# Patient Record
Sex: MALE | Race: BLACK OR AFRICAN AMERICAN | Employment: OTHER | URBAN - METROPOLITAN AREA
[De-identification: names, ages, dates, MRNs, and addresses within clinical notes are randomized per-mention and may not be internally consistent; named-entity substitution may affect disease eponyms.]

---

## 2017-10-22 ENCOUNTER — HOSPITAL ENCOUNTER (EMERGENCY)
Age: 67
Discharge: HOME | End: 2017-10-22
Attending: EMERGENCY MEDICINE | Admitting: EMERGENCY MEDICINE
Payer: MEDICARE

## 2017-10-22 ENCOUNTER — APPOINTMENT (OUTPATIENT)
Dept: GENERAL RADIOLOGY | Age: 67
End: 2017-10-22
Attending: PHYSICIAN ASSISTANT
Payer: MEDICARE

## 2017-10-22 VITALS
WEIGHT: 159.9 LBS | SYSTOLIC BLOOD PRESSURE: 150 MMHG | DIASTOLIC BLOOD PRESSURE: 90 MMHG | HEART RATE: 78 BPM | TEMPERATURE: 98 F | OXYGEN SATURATION: 99 % | RESPIRATION RATE: 21 BRPM | BODY MASS INDEX: 21.66 KG/M2 | HEIGHT: 72 IN

## 2017-10-22 DIAGNOSIS — R42 DIZZINESS: ICD-10-CM

## 2017-10-22 DIAGNOSIS — I20.8 OTHER FORMS OF ANGINA PECTORIS (HCC): Primary | ICD-10-CM

## 2017-10-22 DIAGNOSIS — G20 PARKINSON'S DISEASE (HCC): ICD-10-CM

## 2017-10-22 DIAGNOSIS — M48.062 SPINAL STENOSIS OF LUMBAR REGION WITH NEUROGENIC CLAUDICATION: ICD-10-CM

## 2017-10-22 DIAGNOSIS — R07.89 ATYPICAL CHEST PAIN: Primary | ICD-10-CM

## 2017-10-22 LAB
ALBUMIN SERPL-MCNC: 3.3 G/DL (ref 3.5–5)
ALBUMIN/GLOB SERPL: 0.9 {RATIO} (ref 1.1–2.2)
ALP SERPL-CCNC: 96 U/L (ref 45–117)
ALT SERPL-CCNC: 28 U/L (ref 12–78)
ANION GAP SERPL CALC-SCNC: 10 MMOL/L (ref 5–15)
AST SERPL-CCNC: 19 U/L (ref 15–37)
ATRIAL RATE: 64 BPM
BASOPHILS # BLD: 0 K/UL (ref 0–0.1)
BASOPHILS NFR BLD: 1 % (ref 0–1)
BILIRUB SERPL-MCNC: 0.4 MG/DL (ref 0.2–1)
BUN SERPL-MCNC: 11 MG/DL (ref 6–20)
BUN/CREAT SERPL: 10 (ref 12–20)
CALCIUM SERPL-MCNC: 8.9 MG/DL (ref 8.5–10.1)
CALCULATED P AXIS, ECG09: 50 DEGREES
CALCULATED R AXIS, ECG10: 55 DEGREES
CALCULATED T AXIS, ECG11: 50 DEGREES
CHLORIDE SERPL-SCNC: 109 MMOL/L (ref 97–108)
CK SERPL-CCNC: 60 U/L (ref 39–308)
CO2 SERPL-SCNC: 26 MMOL/L (ref 21–32)
CREAT SERPL-MCNC: 1.07 MG/DL (ref 0.7–1.3)
DIAGNOSIS, 93000: NORMAL
EOSINOPHIL # BLD: 0.2 K/UL (ref 0–0.4)
EOSINOPHIL NFR BLD: 3 % (ref 0–7)
ERYTHROCYTE [DISTWIDTH] IN BLOOD BY AUTOMATED COUNT: 14.3 % (ref 11.5–14.5)
GLOBULIN SER CALC-MCNC: 3.6 G/DL (ref 2–4)
GLUCOSE SERPL-MCNC: 136 MG/DL (ref 65–100)
HCT VFR BLD AUTO: 41.6 % (ref 36.6–50.3)
HGB BLD-MCNC: 13.8 G/DL (ref 12.1–17)
LYMPHOCYTES # BLD: 1.2 K/UL (ref 0.8–3.5)
LYMPHOCYTES NFR BLD: 20 % (ref 12–49)
MCH RBC QN AUTO: 31.7 PG (ref 26–34)
MCHC RBC AUTO-ENTMCNC: 33.2 G/DL (ref 30–36.5)
MCV RBC AUTO: 95.4 FL (ref 80–99)
MONOCYTES # BLD: 0.6 K/UL (ref 0–1)
MONOCYTES NFR BLD: 10 % (ref 5–13)
NEUTS SEG # BLD: 4 K/UL (ref 1.8–8)
NEUTS SEG NFR BLD: 66 % (ref 32–75)
P-R INTERVAL, ECG05: 152 MS
PLATELET # BLD AUTO: 175 K/UL (ref 150–400)
POTASSIUM SERPL-SCNC: 4.1 MMOL/L (ref 3.5–5.1)
PROT SERPL-MCNC: 6.9 G/DL (ref 6.4–8.2)
Q-T INTERVAL, ECG07: 412 MS
QRS DURATION, ECG06: 92 MS
QTC CALCULATION (BEZET), ECG08: 425 MS
RBC # BLD AUTO: 4.36 M/UL (ref 4.1–5.7)
SODIUM SERPL-SCNC: 145 MMOL/L (ref 136–145)
TROPONIN I SERPL-MCNC: <0.04 NG/ML
VENTRICULAR RATE, ECG03: 64 BPM
WBC # BLD AUTO: 6 K/UL (ref 4.1–11.1)

## 2017-10-22 PROCEDURE — 82550 ASSAY OF CK (CPK): CPT | Performed by: PHYSICIAN ASSISTANT

## 2017-10-22 PROCEDURE — 74011250637 HC RX REV CODE- 250/637: Performed by: PHYSICIAN ASSISTANT

## 2017-10-22 PROCEDURE — 80053 COMPREHEN METABOLIC PANEL: CPT | Performed by: PHYSICIAN ASSISTANT

## 2017-10-22 PROCEDURE — 84484 ASSAY OF TROPONIN QUANT: CPT | Performed by: PHYSICIAN ASSISTANT

## 2017-10-22 PROCEDURE — 36415 COLL VENOUS BLD VENIPUNCTURE: CPT | Performed by: PHYSICIAN ASSISTANT

## 2017-10-22 PROCEDURE — 85025 COMPLETE CBC W/AUTO DIFF WBC: CPT | Performed by: PHYSICIAN ASSISTANT

## 2017-10-22 PROCEDURE — 99285 EMERGENCY DEPT VISIT HI MDM: CPT

## 2017-10-22 PROCEDURE — 71010 XR CHEST PORT: CPT

## 2017-10-22 PROCEDURE — 93005 ELECTROCARDIOGRAM TRACING: CPT

## 2017-10-22 RX ORDER — ASPIRIN 325 MG
325 TABLET ORAL
Status: COMPLETED | OUTPATIENT
Start: 2017-10-22 | End: 2017-10-22

## 2017-10-22 RX ORDER — ATORVASTATIN CALCIUM 20 MG/1
20 TABLET, FILM COATED ORAL DAILY
COMMUNITY
Start: 2017-07-19 | End: 2022-04-15

## 2017-10-22 RX ORDER — METOPROLOL TARTRATE 25 MG/1
TABLET, FILM COATED ORAL
COMMUNITY
Start: 2017-07-19 | End: 2019-01-09

## 2017-10-22 RX ADMIN — ASPIRIN 325 MG ORAL TABLET 325 MG: 325 PILL ORAL at 14:34

## 2017-10-22 NOTE — PROGRESS NOTES
ED visit for chest pain, acute cardiac event excluded. Patient unable to exercise to target heart rate because of spinal stenosis and Parkinson's.  Will set up ambulatory LexiScan perfusion test.

## 2017-10-22 NOTE — DISCHARGE INSTRUCTIONS
Chest Pain: Care Instructions  Your Care Instructions  There are many things that can cause chest pain. Some are not serious and will get better on their own in a few days. But some kinds of chest pain need more testing and treatment. Your doctor may have recommended a follow-up visit in the next 8 to 12 hours. If you are not getting better, you may need more tests or treatment. Even though your doctor has released you, you still need to watch for any problems. The doctor carefully checked you, but sometimes problems can develop later. If you have new symptoms or if your symptoms do not get better, get medical care right away. If you have worse or different chest pain or pressure that lasts more than 5 minutes or you passed out (lost consciousness), call 911 or seek other emergency help right away. A medical visit is only one step in your treatment. Even if you feel better, you still need to do what your doctor recommends, such as going to all suggested follow-up appointments and taking medicines exactly as directed. This will help you recover and help prevent future problems. How can you care for yourself at home? · Rest until you feel better. · Take your medicine exactly as prescribed. Call your doctor if you think you are having a problem with your medicine. · Do not drive after taking a prescription pain medicine. When should you call for help? Call 911 if:  · You passed out (lost consciousness). · You have severe difficulty breathing. · You have symptoms of a heart attack. These may include:  ¨ Chest pain or pressure, or a strange feeling in your chest.  ¨ Sweating. ¨ Shortness of breath. ¨ Nausea or vomiting. ¨ Pain, pressure, or a strange feeling in your back, neck, jaw, or upper belly or in one or both shoulders or arms. ¨ Lightheadedness or sudden weakness. ¨ A fast or irregular heartbeat.   After you call 911, the  may tell you to chew 1 adult-strength or 2 to 4 low-dose aspirin. Wait for an ambulance. Do not try to drive yourself. Call your doctor today if:  · You have any trouble breathing. · Your chest pain gets worse. · You are dizzy or lightheaded, or you feel like you may faint. · You are not getting better as expected. · You are having new or different chest pain. Where can you learn more? Go to http://mando-shilpi.info/. Enter A120 in the search box to learn more about \"Chest Pain: Care Instructions. \"  Current as of: March 20, 2017  Content Version: 11.3  © 1102-4708 [employer]. Care instructions adapted under license by [employer] (which disclaims liability or warranty for this information). If you have questions about a medical condition or this instruction, always ask your healthcare professional. [address] any warranty or liability for your use of this information.

## 2017-10-22 NOTE — ED PROVIDER NOTES
Patient is a 69 y.o. male presenting with chest pain. The history is provided by the patient. Chest Pain (Angina)    This is a new problem. The current episode started 2 days ago. The problem has not changed since onset. The problem occurs constantly. The pain is present in the left side. The pain is at a severity of 4/10. The pain is mild. The quality of the pain is described as dull. The pain does not radiate. Exacerbated by: standing. Associated symptoms include dizziness and shortness of breath (started today). Pertinent negatives include no abdominal pain, no back pain, no cough, no diaphoresis, no exertional chest pressure, no fever, no nausea, no vomiting and no weakness. He has tried nothing for the symptoms. Risk factors include male gender. Past medical history comments: COPD, CVA, parkinson's dz. Past Medical History:   Diagnosis Date    Chronic obstructive pulmonary disease ([facility]     emphysema    Parkinson's disease ([facility]     Stroke ([facility] [address] [facility] 2002    no residual problems       Past Surgical History:   Procedure Laterality Date    HX CATARACT REMOVAL      right    HX TONSILLECTOMY           No family history on file. Social History     Social History    Marital status:      Spouse name: N/A    Number of children: N/A    Years of education: N/A     Occupational History    Not on file. Social History Main Topics    Smoking status: Former Smoker    Smokeless tobacco: Never Used    Alcohol use 2.4 oz/week     4 Glasses of wine per week    Drug use: No    Sexual activity: Not on file     Other Topics Concern    Not on file     Social History Narrative         ALLERGIES: Review of patient's allergies indicates no known allergies. Review of Systems   Constitutional: Negative for diaphoresis and fever. Respiratory: Positive for shortness of breath (started today). Negative for cough, chest tightness, wheezing and stridor. Cardiovascular: Positive for chest pain.  Negative for leg swelling. Gastrointestinal: Negative for abdominal pain, nausea and vomiting. Musculoskeletal: Negative for back pain. Neurological: Positive for dizziness. Negative for speech difficulty and weakness. All other systems reviewed and are negative. Vitals:    10/22/17 1423   BP: (!) 147/92   Pulse: 73   Resp: 18   Temp: 98 °F (36.7 °C)   SpO2: 98%            Physical Exam   Constitutional: He is oriented to person, place, and time. He appears well-developed and well-nourished. No distress. HENT:   Head: Normocephalic and atraumatic. Mouth/Throat: Oropharynx is clear and moist. No oropharyngeal exudate. Eyes: Conjunctivae are normal.   Neck: Normal range of motion. Cardiovascular: Normal rate, regular rhythm and normal heart sounds. Pulmonary/Chest: Effort normal and breath sounds normal. No respiratory distress. He has no wheezes. He has no rales. Musculoskeletal: Normal range of motion. Neurological: He is alert and oriented to person, place, and time. Skin: Skin is warm and dry. Psychiatric: He has a normal mood and affect. His behavior is normal. Judgment and thought content normal.   Nursing note and vitals reviewed. MDM  Number of Diagnoses or Management Options  Atypical chest pain:   Diagnosis management comments: DDX:  COPD exacerbation, ACS, angina, CAD, musculoskeletal CP, PNA       Amount and/or Complexity of Data Reviewed  Clinical lab tests: ordered and reviewed  Tests in the radiology section of CPT®: ordered and reviewed  Discuss the patient with other providers: yes (Attending, Dr Maria L Hernandez  3:44 PM   He also went to see pt and we reviewed labs and imaging results. We will consult cardiologist, Dr Stacey Johnson, since he is in ED now to determine tx and plan)      ED Course       Procedures    ED EKG interpretation:  Rhythm: normal sinus rhythm; and regular .  Rate (approx.): 64; Axis: normal; P wave: normal; QRS interval: normal ; ST/T wave: normal. This EKG was interpreted by Ingrid Harper PA-C,ED Provider. MEDICATIONS GIVEN:  Medications   aspirin (ASPIRIN) tablet 325 mg (325 mg Oral Given 10/22/17 1434)       LABS REVIEWED:  Recent Results (from the past 24 hour(s))   EKG, 12 LEAD, INITIAL    Collection Time: 10/22/17  2:24 PM   Result Value Ref Range    Ventricular Rate 64 BPM    Atrial Rate 64 BPM    P-R Interval 152 ms    QRS Duration 92 ms    Q-T Interval 412 ms    QTC Calculation (Bezet) 425 ms    Calculated P Axis 50 degrees    Calculated R Axis 55 degrees    Calculated T Axis 50 degrees    Diagnosis       Normal sinus rhythm  Normal ECG  No previous ECGs available     CBC WITH AUTOMATED DIFF    Collection Time: 10/22/17  2:50 PM   Result Value Ref Range    WBC 6.0 4.1 - 11.1 K/uL    RBC 4.36 4.10 - 5.70 M/uL    HGB 13.8 12.1 - 17.0 g/dL    HCT 41.6 36.6 - 50.3 %    MCV 95.4 80.0 - 99.0 FL    MCH 31.7 26.0 - 34.0 PG    MCHC 33.2 30.0 - 36.5 g/dL    RDW 14.3 11.5 - 14.5 %    PLATELET 923 332 - 124 K/uL    NEUTROPHILS 66 32 - 75 %    LYMPHOCYTES 20 12 - 49 %    MONOCYTES 10 5 - 13 %    EOSINOPHILS 3 0 - 7 %    BASOPHILS 1 0 - 1 %    ABS. NEUTROPHILS 4.0 1.8 - 8.0 K/UL    ABS. LYMPHOCYTES 1.2 0.8 - 3.5 K/UL    ABS. MONOCYTES 0.6 0.0 - 1.0 K/UL    ABS. EOSINOPHILS 0.2 0.0 - 0.4 K/UL    ABS.  BASOPHILS 0.0 0.0 - 0.1 K/UL   TROPONIN I    Collection Time: 10/22/17  2:50 PM   Result Value Ref Range    Troponin-I, Qt. <0.04 <0.05 ng/mL   CK W/ REFLX CKMB    Collection Time: 10/22/17  2:50 PM   Result Value Ref Range    CK 60 39 - 106 U/L   METABOLIC PANEL, COMPREHENSIVE    Collection Time: 10/22/17  2:50 PM   Result Value Ref Range    Sodium 145 136 - 145 mmol/L    Potassium 4.1 3.5 - 5.1 mmol/L    Chloride 109 (H) 97 - 108 mmol/L    CO2 26 21 - 32 mmol/L    Anion gap 10 5 - 15 mmol/L    Glucose 136 (H) 65 - 100 mg/dL    BUN 11 6 - 20 MG/DL    Creatinine 1.07 0.70 - 1.30 MG/DL    BUN/Creatinine ratio 10 (L) 12 - 20      GFR est AA >60 >60 ml/min/1.73m2    GFR est non-AA >60 >60 ml/min/1.73m2    Calcium 8.9 8.5 - 10.1 MG/DL    Bilirubin, total 0.4 0.2 - 1.0 MG/DL    ALT (SGPT) 28 12 - 78 U/L    AST (SGOT) 19 15 - 37 U/L    Alk. phosphatase 96 45 - 117 U/L    Protein, total 6.9 6.4 - 8.2 g/dL    Albumin 3.3 (L) 3.5 - 5.0 g/dL    Globulin 3.6 2.0 - 4.0 g/dL    A-G Ratio 0.9 (L) 1.1 - 2.2         VITAL SIGNS:  Patient Vitals for the past 12 hrs:   Temp Pulse Resp BP SpO2   10/22/17 1600 - 78 21 150/90 99 %   10/22/17 1516 - 76 19 172/84 99 %   10/22/17 1515 - 64 16 (!) 160/141 98 %   10/22/17 1505 - 65 17 - 98 %   10/22/17 1500 - 68 15 - 98 %   10/22/17 1452 - 65 17 - 98 %   10/22/17 1445 - 74 15 - 99 %   10/22/17 1439 - 85 19 - 99 %   10/22/17 1426 - - - (!) 147/92 -   10/22/17 1423 98 °F (36.7 °C) 73 18 (!) 147/92 98 %       RADIOLOGY RESULTS:  The following have been ordered and reviewed:  XR CHEST PORT   Final Result        Study Result      EXAM:  XR CHEST PORT     INDICATION:  chest pain     COMPARISON:  None.     FINDINGS: A portable AP radiograph of the chest was obtained at 1430 hours. The  patient is on a cardiac monitor. The lungs are hyperinflated but clear. The  cardiac and mediastinal contours and pulmonary vascularity are normal.  The  bones and soft tissues are grossly within normal limits.      IMPRESSION  IMPRESSION: No acute findings. CONSULTATIONS:     4:45 PM    I spoke with Dr. Milagro Salinas, Consult for Cardiology. Discussed available diagnostic tests and clinical findings. He went in to evaluate pt. He feels symptoms likely muscular in nature and does not feel pt needs admission at this time but will f/u with him for outpatient stress test.  Zander Collazo PA-C      PROGRESS NOTES:  A/P  4:45 PM  Reviewed labs and/or imaging results with pt. The patient's signs, symptoms, diagnosis, and discharge instruction have been discussed and the patient has conveyed their understanding.  The patient is to follow up with Dr Milagro Salinas or return to the ER should their symptoms worsen. Plan has been discussed and the patient is in agreement. Pt is ready for discharge      DIAGNOSIS:    1. Atypical chest pain        PLAN:  Follow-up Information     Follow up With Details Comments Contact Info    Kishor Adams MD Schedule an appointment as soon as possible for a visit on 10/23/2017 for outpatient stress test [address]      [facility] EM[facility]  If symptoms worsen Katarina Cao        Current Discharge Medication List      CONTINUE these medications which have NOT CHANGED    Details   albuterol (PROAIR HFA) 90 mcg/actuation inhaler Take 2 Puffs by inhalation every six (6) hours as needed for Wheezing. budesonide-formoterol (SYMBICORT) 80-4.5 mcg/actuation HFAA inhaler Take 2 Puffs by inhalation two (2) times a day. aspirin delayed-release 81 mg tablet Take 81 mg by mouth daily.

## 2017-10-22 NOTE — ED NOTES
Emergency Department Nursing Plan of Care       The Nursing Plan of Care is developed from the Nursing assessment and Emergency Department Attending provider initial evaluation. The plan of care may be reviewed in the ED Provider note.     The Plan of Care was developed with the following considerations:   Patient / Family readiness to learn indicated by:verbalized understanding and appropriate questions asked  Persons(s) to be included in education: patient  Barriers to Learning/Limitations:No    Signed     Adelfo Belcher    10/22/2017   2:36 PM
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
## 2018-01-30 ENCOUNTER — HOSPITAL ENCOUNTER (OUTPATIENT)
Dept: NEUROLOGY | Age: 68
Discharge: HOME | End: 2018-01-30
Attending: GENERAL ACUTE CARE HOSPITAL
Payer: MEDICARE

## 2018-01-30 DIAGNOSIS — G40.909 EPILEPSY (HCC): ICD-10-CM

## 2018-01-30 PROCEDURE — 95816 EEG AWAKE AND DROWSY: CPT

## 2018-10-15 ENCOUNTER — HOSPITAL ENCOUNTER (OUTPATIENT)
Dept: CT IMAGING | Age: 68
Discharge: HOME | End: 2018-10-15
Payer: MEDICARE

## 2018-10-15 DIAGNOSIS — R64 CACHEXIA (HCC): ICD-10-CM

## 2018-10-15 LAB — CREAT BLD-MCNC: 0.9 MG/DL (ref 0.6–1.3)

## 2018-10-15 PROCEDURE — 74011000255 HC RX REV CODE- 255: Performed by: RADIOLOGY

## 2018-10-15 PROCEDURE — 82565 ASSAY OF CREATININE: CPT

## 2018-10-15 PROCEDURE — 74011636320 HC RX REV CODE- 636/320: Performed by: RADIOLOGY

## 2018-10-15 PROCEDURE — 74177 CT ABD & PELVIS W/CONTRAST: CPT

## 2018-10-15 RX ORDER — SODIUM CHLORIDE 0.9 % (FLUSH) 0.9 %
10 SYRINGE (ML) INJECTION
Status: COMPLETED | OUTPATIENT
Start: 2018-10-15 | End: 2018-10-15

## 2018-10-15 RX ORDER — BARIUM SULFATE 20 MG/ML
900 SUSPENSION ORAL
Status: COMPLETED | OUTPATIENT
Start: 2018-10-15 | End: 2018-10-15

## 2018-10-15 RX ADMIN — IOPAMIDOL 100 ML: 755 INJECTION, SOLUTION INTRAVENOUS at 14:40

## 2018-10-15 RX ADMIN — Medication 10 ML: at 14:40

## 2018-10-15 RX ADMIN — BARIUM SULFATE 900 ML: 20 SUSPENSION ORAL at 14:40

## 2018-11-01 ENCOUNTER — HOSPITAL ENCOUNTER (OUTPATIENT)
Dept: CT IMAGING | Age: 68
Discharge: HOME | End: 2018-11-01
Attending: FAMILY MEDICINE
Payer: MEDICARE

## 2018-11-01 DIAGNOSIS — R63.4 UNINTENTIONAL WEIGHT LOSS: ICD-10-CM

## 2018-11-01 PROCEDURE — 71260 CT THORAX DX C+: CPT

## 2018-11-01 PROCEDURE — 74011636320 HC RX REV CODE- 636/320

## 2018-11-01 RX ADMIN — IOPAMIDOL 100 ML: 612 INJECTION, SOLUTION INTRAVENOUS at 12:00

## 2019-01-09 RX ORDER — CITALOPRAM 10 MG/1
10 TABLET ORAL DAILY
COMMUNITY

## 2019-01-09 RX ORDER — ALBUTEROL SULFATE 90 UG/1
2 AEROSOL, METERED RESPIRATORY (INHALATION)
COMMUNITY

## 2019-01-09 RX ORDER — ACETAMINOPHEN 500 MG
500 TABLET ORAL DAILY
COMMUNITY
End: 2019-05-31

## 2019-01-09 RX ORDER — CARBIDOPA AND LEVODOPA 25; 250 MG/1; MG/1
1 TABLET ORAL DAILY
COMMUNITY
End: 2019-05-31 | Stop reason: SDUPTHER

## 2019-01-09 RX ORDER — RIBOFLAVIN (VITAMIN B2) 100 MG
100 TABLET ORAL DAILY
COMMUNITY
End: 2022-04-15

## 2019-01-09 RX ORDER — TRAZODONE HYDROCHLORIDE 50 MG/1
50 TABLET ORAL
Status: ON HOLD | COMMUNITY
End: 2020-02-19 | Stop reason: CLARIF

## 2019-01-09 RX ORDER — ONDANSETRON HYDROCHLORIDE 8 MG/1
8 TABLET, FILM COATED ORAL
COMMUNITY

## 2019-01-09 RX ORDER — DIAZEPAM 5 MG/1
5 TABLET ORAL DAILY
COMMUNITY
End: 2019-05-31

## 2019-01-09 RX ORDER — METOPROLOL TARTRATE 25 MG/1
25 TABLET, FILM COATED ORAL DAILY
Status: ON HOLD | COMMUNITY
End: 2020-02-19 | Stop reason: CLARIF

## 2019-01-09 RX ORDER — PANTOPRAZOLE SODIUM 40 MG/1
40 TABLET, DELAYED RELEASE ORAL DAILY
Status: ON HOLD | COMMUNITY
End: 2020-02-19 | Stop reason: CLARIF

## 2019-01-10 ENCOUNTER — ANESTHESIA EVENT (OUTPATIENT)
Dept: ENDOSCOPY | Age: 69
End: 2019-01-10
Payer: MEDICARE

## 2019-01-10 ENCOUNTER — ANESTHESIA (OUTPATIENT)
Dept: ENDOSCOPY | Age: 69
End: 2019-01-10
Payer: MEDICARE

## 2019-01-10 ENCOUNTER — HOSPITAL ENCOUNTER (OUTPATIENT)
Age: 69
Setting detail: OUTPATIENT SURGERY
Discharge: HOME | End: 2019-01-10
Attending: INTERNAL MEDICINE | Admitting: INTERNAL MEDICINE
Payer: MEDICARE

## 2019-01-10 VITALS
WEIGHT: 134.7 LBS | SYSTOLIC BLOOD PRESSURE: 144 MMHG | RESPIRATION RATE: 18 BRPM | DIASTOLIC BLOOD PRESSURE: 88 MMHG | HEART RATE: 58 BPM | OXYGEN SATURATION: 98 % | BODY MASS INDEX: 18.27 KG/M2

## 2019-01-10 PROCEDURE — 76060000031 HC ANESTHESIA FIRST 0.5 HR: Performed by: INTERNAL MEDICINE

## 2019-01-10 PROCEDURE — 74011250636 HC RX REV CODE- 250/636

## 2019-01-10 PROCEDURE — 88305 TISSUE EXAM BY PATHOLOGIST: CPT

## 2019-01-10 PROCEDURE — 77030009426 HC FCPS BIOP ENDOSC BSC -B: Performed by: INTERNAL MEDICINE

## 2019-01-10 PROCEDURE — 76040000019: Performed by: INTERNAL MEDICINE

## 2019-01-10 RX ORDER — SODIUM CHLORIDE 9 MG/ML
50 INJECTION, SOLUTION INTRAVENOUS CONTINUOUS
Status: DISCONTINUED | OUTPATIENT
Start: 2019-01-10 | End: 2019-01-10 | Stop reason: HOSPADM

## 2019-01-10 RX ORDER — FENTANYL CITRATE 50 UG/ML
100 INJECTION, SOLUTION INTRAMUSCULAR; INTRAVENOUS
Status: DISCONTINUED | OUTPATIENT
Start: 2019-01-10 | End: 2019-01-10 | Stop reason: HOSPADM

## 2019-01-10 RX ORDER — SODIUM CHLORIDE 0.9 % (FLUSH) 0.9 %
5-40 SYRINGE (ML) INJECTION EVERY 8 HOURS
Status: DISCONTINUED | OUTPATIENT
Start: 2019-01-10 | End: 2019-01-10 | Stop reason: HOSPADM

## 2019-01-10 RX ORDER — MIDAZOLAM HYDROCHLORIDE 1 MG/ML
.25-5 INJECTION, SOLUTION INTRAMUSCULAR; INTRAVENOUS
Status: DISCONTINUED | OUTPATIENT
Start: 2019-01-10 | End: 2019-01-10 | Stop reason: HOSPADM

## 2019-01-10 RX ORDER — SODIUM CHLORIDE 0.9 % (FLUSH) 0.9 %
5-40 SYRINGE (ML) INJECTION AS NEEDED
Status: DISCONTINUED | OUTPATIENT
Start: 2019-01-10 | End: 2019-01-10 | Stop reason: HOSPADM

## 2019-01-10 RX ORDER — DEXTROMETHORPHAN/PSEUDOEPHED 2.5-7.5/.8
1.2 DROPS ORAL
Status: DISCONTINUED | OUTPATIENT
Start: 2019-01-10 | End: 2019-01-10 | Stop reason: HOSPADM

## 2019-01-10 RX ORDER — SODIUM CHLORIDE 9 MG/ML
INJECTION, SOLUTION INTRAVENOUS
Status: DISCONTINUED | OUTPATIENT
Start: 2019-01-10 | End: 2019-01-10 | Stop reason: HOSPADM

## 2019-01-10 RX ORDER — PROPOFOL 10 MG/ML
INJECTION, EMULSION INTRAVENOUS AS NEEDED
Status: DISCONTINUED | OUTPATIENT
Start: 2019-01-10 | End: 2019-01-10 | Stop reason: HOSPADM

## 2019-01-10 RX ORDER — FLUMAZENIL 0.1 MG/ML
0.2 INJECTION INTRAVENOUS
Status: DISCONTINUED | OUTPATIENT
Start: 2019-01-10 | End: 2019-01-10 | Stop reason: HOSPADM

## 2019-01-10 RX ORDER — LIDOCAINE HYDROCHLORIDE 20 MG/ML
INJECTION, SOLUTION EPIDURAL; INFILTRATION; INTRACAUDAL; PERINEURAL AS NEEDED
Status: DISCONTINUED | OUTPATIENT
Start: 2019-01-10 | End: 2019-01-10 | Stop reason: HOSPADM

## 2019-01-10 RX ORDER — ATROPINE SULFATE 0.1 MG/ML
0.5 INJECTION INTRAVENOUS
Status: DISCONTINUED | OUTPATIENT
Start: 2019-01-10 | End: 2019-01-10 | Stop reason: HOSPADM

## 2019-01-10 RX ORDER — NALOXONE HYDROCHLORIDE 0.4 MG/ML
0.4 INJECTION, SOLUTION INTRAMUSCULAR; INTRAVENOUS; SUBCUTANEOUS
Status: DISCONTINUED | OUTPATIENT
Start: 2019-01-10 | End: 2019-01-10 | Stop reason: HOSPADM

## 2019-01-10 RX ORDER — EPINEPHRINE 0.1 MG/ML
1 INJECTION INTRACARDIAC; INTRAVENOUS
Status: DISCONTINUED | OUTPATIENT
Start: 2019-01-10 | End: 2019-01-10 | Stop reason: HOSPADM

## 2019-01-10 RX ADMIN — SODIUM CHLORIDE: 9 INJECTION, SOLUTION INTRAVENOUS at 14:00

## 2019-01-10 RX ADMIN — LIDOCAINE HYDROCHLORIDE 100 MG: 20 INJECTION, SOLUTION EPIDURAL; INFILTRATION; INTRACAUDAL; PERINEURAL at 14:23

## 2019-01-10 RX ADMIN — PROPOFOL 100 MG: 10 INJECTION, EMULSION INTRAVENOUS at 14:23

## 2019-01-10 NOTE — DISCHARGE INSTRUCTIONS
[address]  [address] [facility]           Karyle Pita  [id number]  1950    EGD DISCHARGE INSTRUCTIONS    Discomfort:  Sore throat- throat lozenges or warm salt water gargle  redness at IV site- apply warm compress to area; if redness or soreness persist- contact your physician  Gaseous discomfort- walking, belching will help relieve any discomfort  You may not operate a vehicle for 12 hours  You may not engage in an occupation involving machinery or appliances for rest of today  You may not drink alcoholic beverages for at least 12 hours  Avoid making any critical decisions for at least 24 hour  DIET  You may have anything by mouth- do not eat or drink for two hours. You may eat and drink after you leave. You may resume your regular diet - however -  remember your colon is empty and a heavy meal will produce gas. Avoid these foods:  vegetables, fried / greasy foods, carbonated drinks        ACTIVITY  You may resume your normal daily activities   Spend the remainder of the day resting -  avoid any strenuous activity. CALL M.D. ANY SIGN OF   Increasing pain, nausea, vomiting  Abdominal distension (swelling)  New increased bleeding (oral or rectal)  Fever (chills)  Pain in chest area  Bloody discharge from nose or mouth  Shortness of breath    Follow-up Instructions:   Call Timmy Palacios for any questions or problems. Telephone # [phone]  Biopsy results available  in  5 to 7 days. We will call you or send a letter   Continue same medications.     Impression:  gastrits, R/O infiltrative process    Vitor Winslow MD  1/10/2019  2:41 PM

## 2019-01-10 NOTE — H&P
[address]                 Shawnee Gallegos is a  77 y.o.  male who presents with epigastric pain , anorexia and 30 pound weight loss. .        Past Medical History:   Diagnosis Date    Arthritis     Chronic obstructive pulmonary disease (HCC)     emphysema    Ill-defined condition     hx shingles    Ill-defined condition     hx broken left shoulder - no surgery    Ill-defined condition     hx dislocated left shoulder    Ill-defined condition     \"blood not flowing as well through aorta per pt\"    Ill-defined condition     spinal stenosis - PT/injection/accupunture/exercise [facility] 2 tmes a week    [facility]'s [facility] [address] [address]     Stroke ([facility] [address] 2002    no residual problems     Past Surgical History:   Procedure Laterality Date    HX CATARACT REMOVAL Bilateral     HX ORTHOPAEDIC      steroid injections in back d/t spinal stenosis    HX TONSILLECTOMY       Allergies   Allergen Reactions    Lisinopril Cough    Other Medication Rash     Vaccine for polio caused a rash.      Current Facility-Administered Medications   Medication Dose Route Frequency Provider Last Rate Last Dose    0.9% sodium chloride infusion  50 mL/hr IntraVENous CONTINUOUS Meron Castro MD        sodium chloride (NS) flush 5-40 mL  5-40 mL IntraVENous Q8H Meron Castro MD        sodium chloride (NS) flush 5-40 mL  5-40 mL IntraVENous PRN Meron Castro MD        midazolam (VERSED) injection 0.25-5 mg  0.25-5 mg IntraVENous Dain Castro MD        fentaNYL citrate (PF) injection 100 mcg  100 mcg IntraVENous Dain Castro MD        naloxone [facility]) injection 0.4 mg  0.4 mg IntraVENous Dain Castro MD        flumazenil (ROMAZICON) 0.1 mg/mL injection 0.2 mg  0.2 mg IntraVENous Dain Castro MD        [facility]) 81SI/2.1WU oral drops 80 mg  1.2 mL Oral Dain Castro MD        atropine injection 0.5 mg  0.5 mg IntraVENous ONCE PRN Kleber Guillory MD        EPINEPHrine (ADRENALIN) 0.1 mg/mL syringe 1 mg  1 mg Endoscopically ONCE PRN Kleber Guillory MD         Facility-Administered Medications Ordered in Other Encounters   Medication Dose Route Frequency Provider Last Rate Last Dose    0.9% sodium chloride infusion   IntraVENous CONTINUOUS Indy Ramsay, DINORAH           Visit Vitals  /72   Pulse 67   Resp 18   Wt 61.1 kg (134 lb 11.2 oz)   SpO2 100%   BMI 18.27 kg/m²           PHYSICAL EXAM:  General: WD, WN. Alert, cooperative, no acute distress    HEENT: NC, Atraumatic. PERRLA, EOMI. Anicteric sclerae. Mallampati score 2  Lungs:  CTA Bilaterally. No Wheezing/Rhonchi/Rales. Heart:  Regular  rhythm,  No murmur (), No Rubs, No Gallops  Abdomen: Soft, Non distended, Non tender.  +Bowel sounds, no HSM  Extremities: No c/c/e  Neurologic:  CN 2-12 gi, Alert and oriented X 3. No acute neurological distress   Psych:   Good insight. Not anxious nor agitated. Plan:   Endoscopic procedure with MAC.     Priscilla José MD  1/10/2019  2:23 PM

## 2019-01-10 NOTE — PROCEDURES
[address]  [address]               :  Shahla Washington MD    Referring Provider: Thomas Doss MD    Sedation:  MAC anesthesia Propofol        Prior to the procedure its objectives, risks, consequences and alternatives were discussed with the patient who then elected to proceed. The patient had the opportunity to ask questions and those questions were answered. A physical exam was performed. The heart, lungs, and mental status were examined prior to the procedure and found to be satisfactory for conscious sedation and for the procedure. Conscious sedation was initiated by the physician. Continuous pulse oximetry and blood pressure monitoring were used throughout the procedure. After appropriate pharyngeal anesthesia, the endoscope was passed into the esophagus without difficulty. The proximal and distal esophagus are normal. The scope was passed into the stomach without difficulty. The fundus is normal. In the body and antrum, there is moderate gastritis with suggestion of infiltrative process. A biopsy was obtained for Surgical path without problems from the body and antrum. The pylorus, bulb and postbulbar area are unremarkable. On slow withdrawal of the scope, no abnormalities were noted. Retroflexion revealed normal cardia and fundus. He tolerated the procedure without complications and will be discharged later today. Specimen Removed:  Biopsy of antrum    Complications: None. EBL:  None.     Shahla Washington MD  1/10/2019  2:39 PM

## 2019-01-10 NOTE — ROUTINE PROCESS
Héctor Lama  1950  [id number]    Situation:  Verbal report received from: Isela Prieto RN  Procedure: Procedure(s):  ESOPHAGOGASTRODUODENOSCOPY (EGD)  ESOPHAGOGASTRODUODENAL (EGD) BIOPSY    Background:    Preoperative diagnosis: ABNORMAL WEIGHTLOSS  NASEASA AND VOMITING  ABDOMINAL PAIN  EPIGASTRIC PAIN  Postoperative diagnosis: gastrits, R/O infiltrative process    :  Dr. Meka Rosales  Assistant(s): Endoscopy Technician-1: Ailin Miller  Endoscopy RN-1: Lord Mary RN    Specimens:   ID Type Source Tests Collected by Time Destination   1 : gasttic bx, please R/O infiltrative process, gastritis Preservative   Pedro Krishnamurthy MD 1/10/2019 1427 Pathology     H. Pylori  no    Assessment:  Intra-procedure medications       Anesthesia gave intra-procedure sedation and medications, see anesthesia flow sheet yes    Intravenous fluids: NS@ KVO     Vital signs stable     Abdominal assessment: round and soft     Recommendation:  Discharge patient per MD order  Family or Friend  Pt brother  Permission to share finding with family or friend yes

## 2019-01-10 NOTE — ANESTHESIA POSTPROCEDURE EVALUATION
Post-Anesthesia Evaluation and Assessment Patient: Tuan Stewart MRN: [id number]  SSN: [id number] YOB: 1950  Age: 77 y.o. Sex: male I have evaluated the patient and they are stable and ready for discharge from the PACU. Cardiovascular Function/Vital Signs Visit Vitals BP (P) 123/84 Pulse (P) 60 Resp (P) 14 Wt 61.1 kg (134 lb 11.2 oz) SpO2 (P) 96% BMI 18.27 kg/m² Patient is status post MAC anesthesia for Procedure(s): ESOPHAGOGASTRODUODENOSCOPY (EGD) ESOPHAGOGASTRODUODENAL (EGD) BIOPSY. Nausea/Vomiting: None Postoperative hydration reviewed and adequate. Pain: 
Pain Scale 1: (P) Numeric (0 - 10) (01/10/19 1447) Pain Intensity 1: (P) 0 (01/10/19 1447) Managed Neurological Status: At baseline Mental Status, Level of Consciousness: Alert and  oriented to person, place, and time Pulmonary Status:  
O2 Device: (P) Room air (01/10/19 1447) Adequate oxygenation and airway patent Complications related to anesthesia: None Post-anesthesia assessment completed. No concerns Signed By: Luna Judge MD   
 January 10, 2019 Procedure(s): ESOPHAGOGASTRODUODENOSCOPY (EGD) ESOPHAGOGASTRODUODENAL (EGD) BIOPSY. <BSHSIANPOST> Visit Vitals BP (P) 123/84 Pulse (P) 60 Resp (P) 14 Wt 61.1 kg (134 lb 11.2 oz) SpO2 (P) 96% BMI 18.27 kg/m²

## 2019-01-10 NOTE — ANESTHESIA PREPROCEDURE EVALUATION
Anesthetic History No history of anesthetic complications Review of Systems / Medical History Patient summary reviewed, nursing notes reviewed and pertinent labs reviewed Pulmonary COPD: moderate Neuro/Psych CVA Comments: Parkinsons Cardiovascular Hypertension Exercise tolerance: >4 METS 
  
GI/Hepatic/Renal 
  
GERD Endo/Other Arthritis Other Findings Physical Exam 
 
Airway Mallampati: II 
TM Distance: > 6 cm Neck ROM: normal range of motion Mouth opening: Normal 
 
 Cardiovascular Regular rate and rhythm,  S1 and S2 normal,  no murmur, click, rub, or gallop Dental 
 
Dentition: Full upper dentures and Full lower dentures Pulmonary Breath sounds clear to auscultation Abdominal 
GI exam deferred Other Findings Anesthetic Plan ASA: 3 Anesthesia type: MAC Anesthetic plan and risks discussed with: Patient

## 2019-01-10 NOTE — PERIOP NOTES
Patient has been evaluated by anesthesia pre-procedure. Patient alert and oriented. Vital signs will not be charted by the Endoscopy nurse. All vitals, airway, and loc are monitored by anesthesia staff throughout procedure.        .Endoscope was pre-cleaned at bedside immediately following procedure by Alex Schmitt

## 2019-05-31 ENCOUNTER — OFFICE VISIT (OUTPATIENT)
Dept: NEUROLOGY | Age: 69
End: 2019-05-31

## 2019-05-31 VITALS
HEART RATE: 80 BPM | WEIGHT: 140.2 LBS | DIASTOLIC BLOOD PRESSURE: 56 MMHG | SYSTOLIC BLOOD PRESSURE: 88 MMHG | OXYGEN SATURATION: 93 % | HEIGHT: 72 IN | TEMPERATURE: 98.1 F | RESPIRATION RATE: 16 BRPM | BODY MASS INDEX: 18.99 KG/M2

## 2019-05-31 DIAGNOSIS — G20 PARKINSON'S DISEASE (HCC): Primary | ICD-10-CM

## 2019-05-31 RX ORDER — CARBIDOPA AND LEVODOPA 25; 250 MG/1; MG/1
1 TABLET ORAL 3 TIMES DAILY
Qty: 90 TAB | Refills: 6 | Status: SHIPPED | OUTPATIENT
Start: 2019-05-31 | End: 2019-11-21 | Stop reason: SDUPTHER

## 2019-05-31 NOTE — PROGRESS NOTES
Judith Arita is a 68 y.o. male      Exam RM: 15       Chief Complaint   Patient presents with    New Patient     Pt is here to establish care.               Health Maintenance Due   Topic Date Due    Hepatitis C Screening  1950    DTaP/Tdap/Td series (1 - Tdap) 04/04/1969    Shingrix Vaccine Age 50> (1 of 2) 04/04/1998    FOBT Q 1 YEAR AGE 50-75  04/04/1998    GLAUCOMA SCREENING Q2Y  04/04/2013    Pneumococcal 65+ years (1 of 2 - PCV13) 04/04/2013    MEDICARE YEARLY EXAM  03/14/2018         Visit Vitals  BP (!) 88/56 (BP 1 Location: Left arm, BP Patient Position: Sitting)   Pulse 80   Temp 98.1 °F (36.7 °C) (Oral)   Resp 16   Ht 6' (1.829 m)   Wt 140 lb 3.2 oz (63.6 kg)   SpO2 93%   BMI 19.01 kg/m²

## 2019-05-31 NOTE — LETTER
6/3/19 Patient: Jodi Gonzalez YOB: 1950 Date of Visit: 5/31/2019 Sid Albright MD 
[address] [facility] [address] VIA Facsimile: [phone] Dear Sid Albright MD, Thank you for referring Mr. Juan Montemayor to [address] [place] [address] for evaluation. My notes for this consultation are attached. If you have questions, please do not hesitate to call me. I look forward to following your patient along with you. Sincerely, Kathy Moreno MD

## 2019-05-31 NOTE — PROGRESS NOTES
Neurology Consult Note      HISTORY PROVIDED BY: patient    Chief Complaint:   Chief Complaint   Patient presents with    New Patient     Pt is here to establish care. Subjective:    Lisa Romero is a 68 y.o. right handed male who presents in consultation for Parkinson's disease. Pt reports that he was diagnosed with PD in 2008. Notices tremors with eating or pouring things. He drags his right foot, he believes due to PD, but also mentions spinal stenosis. No freezing. No swallowing difficulty. He has lost 30lbs in last year for unclear reasons, still being investigated, may have vomiting shortly after eating. No dizziness with standing, stands slowly, no LOC. No constipation. No hallucinations. He is taking Carbidopa-levodopa 25-100mg only 1 tab a day. He has hypophonia and asked how long he had this, he states he hasn't noticed it. 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 His maternal uncle was the  Govenor and first elected black govenor, Harriet Ho. Pt reports that he is worried about his memory, may forget a name or a word. His mother has dementia, but is 81yo. Notes from PCP at urgent care received and reviewed, office visit 4/16/2019-notes gradual worsening of tremors on diazepam and levodopa carbidopa. Has not followed with neurology in a long time. CT abdomen chest negative, patient had weight loss of 20 pounds since July. EGD scheduled. Mention of CVA without residual deficits. History of neoplasm of the thymus.     Past Medical History:   Diagnosis Date    Aortic atherosclerosis ([address]     Arthritis     Chronic obstructive pulmonary disease (HCC)     emphysema    Depression     History of left shoulder fracture     Hyperlipidemia     Hypertension     Ill-defined condition     hx dislocated left shoulder    Lumbar spinal stenosis     PT/injection/accupunture/exercise [employer] 2 tmes a week    Parkinson's disease ([address]     Shingles     Stroke [facility]) 2002    no residual problems      Past Surgical History:   Procedure Laterality Date    HX CATARACT REMOVAL Bilateral     HX ORTHOPAEDIC      steroid injections in back d/t spinal stenosis    HX TONSILLECTOMY        Social History     Socioeconomic History    Marital status: UNKNOWN     Spouse name: Not on file    Number of children: Not on file    Years of education: Not on file    Highest education level: Not on file   Occupational History    Not on file   Social Needs    Financial resource strain: Not on file    Food insecurity:     Worry: Not on file     Inability: Not on file    Transportation needs:     Medical: Not on file     Non-medical: Not on file   Tobacco Use    Smoking status: Former Smoker    Smokeless tobacco: Never Used    Tobacco comment: quit 31 yrs ago   Substance and Sexual Activity    Alcohol use:  Yes     Alcohol/week: 4.2 oz     Types: 7 Glasses of wine per week     Comment: wine with dinner/mixed drink    Drug use: No    Sexual activity: Not on file   Lifestyle    Physical activity:     Days per week: Not on file     Minutes per session: Not on file    Stress: Not on file   Relationships    Social connections:     Talks on phone: Not on file     Gets together: Not on file     Attends Gnosticism service: Not on file     Active member of club or organization: Not on file     Attends meetings of clubs or organizations: Not on file     Relationship status: Not on file    Intimate partner violence:     Fear of current or ex partner: Not on file     Emotionally abused: Not on file     Physically abused: Not on file     Forced sexual activity: Not on file   Other Topics Concern    Not on file   Social History Narrative    Lives in [place] alone, does not drive     Family History   Problem Relation Age of Onset    Other Father         kidney removed - unsure of reason, was estranged    Cancer Maternal Aunt         ? where    Heart Attack Paternal Uncle     Cancer Maternal Grandmother         ? where    Kidney Disease Maternal Aunt     Cancer Cousin         ? where - 35s    Heart Attack Paternal Uncle     Kidney Disease Cousin         kidney failure    Alcohol abuse Cousin     Dementia Mother         she is currently 81yo    No Known Problems Daughter          Objective:   Review of Systems   Constitutional: Negative. Wt loss   HENT: Positive for hearing loss and tinnitus. Eyes: Negative. Respiratory: Negative. Cardiovascular: Positive for chest pain. Gastrointestinal: Positive for nausea and vomiting. Genitourinary: Negative. Incontinence   Musculoskeletal: Negative. Skin: Negative. Neurological: Positive for headaches. Endo/Heme/Allergies: Negative. Psychiatric/Behavioral: Positive for depression and memory loss. Allergies   Allergen Reactions    Lisinopril Cough    Other Medication Rash     Vaccine for polio caused a rash. Meds:  Outpatient Medications Prior to Visit   Medication Sig Dispense Refill    ASPIRIN PO Take 81 mg by mouth daily.  carbidopa-levodopa (SINEMET)  mg per tablet Take 1 Tab by mouth daily.  citalopram (CELEXA) 10 mg tablet Take 10 mg by mouth daily.  metoprolol tartrate (LOPRESSOR) 25 mg tablet Take 25 mg by mouth daily.  ondansetron hcl (ZOFRAN) 8 mg tablet Take 8 mg by mouth three (3) times daily as needed for Nausea.  pantoprazole (PROTONIX) 40 mg tablet Take 40 mg by mouth daily.  tamsulosin HCl (TAMSULOSIN PO) Take 0.4 mg by mouth nightly. tamsulosin ER      traZODone (DESYREL) 50 mg tablet Take 50 mg by mouth nightly.  albuterol (VENTOLIN HFA) 90 mcg/actuation inhaler Take 2 Puffs by inhalation two (2) times daily as needed for Wheezing.  riboflavin, vitamin B2, (VITAMIN B-2) 100 mg tablet Take 100 mg by mouth daily.  OTHER Supplement drink. Drinks one po once daily.       atorvastatin (LIPITOR) 20 mg tablet Take 20 mg by mouth daily.  budesonide-formoterol (SYMBICORT) 80-4.5 mcg/actuation HFAA inhaler Take 2 Puffs by inhalation two (2) times a day.  acetaminophen (ACETAMINOPHEN EXTRA STRENGTH) 500 mg tablet Take 500 mg by mouth daily.  diazePAM (VALIUM) 5 mg tablet Take 5 mg by mouth daily. For tremors. No facility-administered medications prior to visit. Imaging:  MRI Results (most recent):  Results from Hospital Encounter encounter on 06/17/15   MRI LUMB SPINE WO CONT    Narrative **Final Report**       ICD Codes / Adm. Diagnosis: 724.4  722.52 / Thoracic or lumbosacral neurit    Degeneration of lumbar or shine  Examination:  MR L SPINE WO CON  - [id number] - Jun 17 2015  1:49PM  Accession No:  [id number]  Reason:        REPORT:  Clinical indication: Back pain. Technical factors: Sagittal T1-weighted T2-weighted and STIR, axial T1 and   T2-weighted L1-S1. There is some mild bone marrow edema is seen in the chair endplate of L4   possibly degenerative in nature. There is no evidence for marrow   replacement. The alignment is satisfactory. The conus appears unremarkable. No paraspinal masses or fluid collections. L1-L2 shows no focal findings. L2-L3 shows minimal facet disease and ligamentum hypertrophy but no canal or   foraminal compromise. L3-L4 shows some minimal facet disease and ligamentum thickening no focal   findings. L4-5 show a left lateral disc protrusion with some encroachment on the   foramen and possible involvement of the exiting nerve roots. There is no   canal compromise. L5-S1 show no focal findings. IMPRESSION: Left lateral disc protrusion L4-5.               Signing/Reading Doctor: Hanh Jamil ([id number])    Approved: Hanh Jamil ([id number])  Jun 17 2015  2:37PM                                    CT Results (most recent):  Results from Hospital Encounter encounter on 11/01/18   CT CHEST W CONT    Narrative INDICATION: Unintentional weight loss    COMPARISON: CT abdomen 10/15/2018, chest radiograph 10/22/2017 and chest CT  9/14/2015    CONTRAST: 100 cc Isovue-300. TECHNIQUE:  Following the uneventful intravenous administration of 100 cc of  Isovue-300, 5 mm axial images were obtained through the chest. Coronal and  sagittal reconstructions were generated. CT dose reduction was achieved through  use of a standardized protocol tailored for this examination and automatic  exposure control for dose modulation. Norris Gutiérrez FINDINGS:    THYROID: No nodule. MEDIASTINUM: No mass or lymphadenopathy. Normal size lymph nodes in the anterior  mediastinum is unchanged. FELY: No mass or lymphadenopathy. THORACIC AORTA: No aneurysm. MAIN PULMONARY ARTERY: Normal in caliber. TRACHEA/BRONCHI: Patent. ESOPHAGUS: No wall thickening or dilatation. HEART: Normal in size. PLEURA: No effusion or pneumothorax. LUNGS: The lungs remain hyperinflated. There is a paucity of normal size and  branching vessels. The lung fields and this suggest emphysema. The degree of  hyperinflation and lucency throughout the lung fields appear somewhat greater in  the lower lobes than in the upper lobes. This difference in the lung zones is  somewhat more apparent on today's examination than previous examination. One  possible explanation for this with the alpha-1 antitrypsin deficiency. Clinical  correlation is suggested. No suspicious nodules are noted. There is a calcified granuloma in the right  lower lobe which is stable. Small air-filled cyst in the right middle lobe is  again noted. Mild scarring in the lingula and right middle lobe is noted with  slight increase in scarring in the lingula. No suspicious nodules are noted. Central airways are patent. INCIDENTALLY IMAGED UPPER ABDOMEN: No focal abnormality. There are diverticula  identified in the colon. BONES: No destructive bone lesion. The bone island in the T5 vertebral body is  unchanged      Impression IMPRESSION:    1.  The lungs are hyperinflated. There is a paucity of normal size and branching  vessels in the lung fields these findings suggest emphysema. There does appear  to be a slight greater degree of involvement of the lower lobes than when  compared the upper lobes. This raises the possibility of alpha-1 antitrypsin  deficiency. Clinical correlation is suggested. Please see above text          Reviewed records in [employer] and media tab today    Lab Review   Results for orders placed or performed during the hospital encounter of 10/15/18   POC CREATININE   Result Value Ref Range    Creatinine (POC) 0.9 0.6 - 1.3 mg/dL    GFRAA, POC >60 >60 ml/min/1.73m2    GFRNA, POC >60 >60 ml/min/1.73m2        Exam:  Visit Vitals  BP (!) 88/56 (BP 1 Location: Left arm, BP Patient Position: Sitting)   Pulse 80   Temp 98.1 °F (36.7 °C) (Oral)   Resp 16   Ht 6' (1.829 m)   Wt 63.6 kg (140 lb 3.2 oz)   SpO2 93%   BMI 19.01 kg/m²     General:  Alert, cooperative, no distress. Head:  Normocephalic, without obvious abnormality, atraumatic. Respiratory:  Heart:   Non labored breathing  Regular rate and rhythm, no murmurs   Neck:   2+ carotids, no bruits   Extremities: Warm, no cyanosis or edema. Pulses: 2+ radial pulses. Neurologic:  MS: Alert and oriented x 4, speech - hypophonia. Language intact. Attention and fund of knowledge appropriate. Recent and remote memory intact.   Cranial Nerves:  II: visual fields Full to confrontation   II: pupils Equal, round, reactive to light   II: optic disc    III,VII: ptosis none   III,IV,VI: extraocular muscles  EOMI, no nystagmus or diplopia   V: facial light touch sensation  normal   VII: facial muscle function   symmetric   VIII: hearing intact   IX: soft palate elevation  normal   XI: trapezius strength  5/5   XI: sternocleidomastoid strength 5/5   XII: tongue  Midline     Motor: normal bulk and tone, no tremor, mild bradykinesia,              Strength: 5/5 throughout, no PD  Sensory: intact to LT, PP  Coordination: FTN and HTS intact, ОЛЬГА intact  Gait: Slight shuffling, does not lift right leg as well as left, dec arm swing in right arm, stooped posture, extra step to turn. +Pull test  Reflexes: 2+ symmetric, toes downgoing           Assessment/Plan   Pt is a 68 y.o. right handed male diagnosed with PD in 2008, reporting an action tremor, drags his right foot, has hypophonia, no freezing. No swallowing difficulty, no NOH, no constipation. no hallucinations. Only taking Carbidopa-levodopa 25-250mg only 1 tab a day. Exam reveals hypophonia slight shuffling does not lift right leg fully, decreased arm swing on the right, stooped posture, extra step to turn, positive pull test.  No resting tremor seen. Patient does have findings suggestive of Parkinson's, primary versus secondary. Discussed diagnosis and treatment, and goals of treatment. Will increase carbidopa levodopa 25-250mg, 1 tab tid, side effects reviewed. Follow-up in the clinic with a nurse practitioner in 2 months and with me in 6 months, instructed to call me interim with any questions or concerns. ICD-10-CM ICD-9-CM    1. Parkinson's disease ([facility] G20 332.0        Signed:   Rony Brock MD  5/31/2019

## 2019-08-01 ENCOUNTER — OFFICE VISIT (OUTPATIENT)
Dept: NEUROLOGY | Age: 69
End: 2019-08-01

## 2019-08-01 VITALS
OXYGEN SATURATION: 95 % | SYSTOLIC BLOOD PRESSURE: 72 MMHG | BODY MASS INDEX: 18.31 KG/M2 | WEIGHT: 135.2 LBS | HEART RATE: 94 BPM | HEIGHT: 72 IN | RESPIRATION RATE: 14 BRPM | TEMPERATURE: 97.3 F | DIASTOLIC BLOOD PRESSURE: 42 MMHG

## 2019-08-01 DIAGNOSIS — G20 PARKINSON'S DISEASE (HCC): Primary | ICD-10-CM

## 2019-08-01 NOTE — PATIENT INSTRUCTIONS
Discuss with PCP about decreasing Metoprolol. Orthostatic Hypotension maybe related to Sinemet or parkinson's.  
 
 
TRACK BLOOD PRESSURE

## 2019-08-01 NOTE — PROGRESS NOTES
Date:  19     Name:  Tessa Andre  :  1950  MRN:  [id number]     PCP:  Fawad Brock MD    Chief Complaint   Patient presents with    Neurologic Problem       HISTORY OF PRESENT ILLNESS:Follow up visit for parkinson's. He is maintained on Sinemet 25-250mg and is tolerating this without significant side effects. He has no concerns. He notes he is doing well. On exam, his blood pressure were low ranging SBP 80's. Orthostatic blood pressure were also done and  his blood pressure did drop. Denies dizziness,lightheadness,       Current Outpatient Medications   Medication Sig    carbidopa-levodopa (SINEMET)  mg per tablet Take 1 Tab by mouth three (3) times daily.  citalopram (CELEXA) 10 mg tablet Take 10 mg by mouth daily.  metoprolol tartrate (LOPRESSOR) 25 mg tablet Take 25 mg by mouth daily.  ondansetron hcl (ZOFRAN) 8 mg tablet Take 8 mg by mouth three (3) times daily as needed for Nausea.  pantoprazole (PROTONIX) 40 mg tablet Take 40 mg by mouth daily.  tamsulosin HCl (TAMSULOSIN PO) Take 0.4 mg by mouth nightly. tamsulosin ER    traZODone (DESYREL) 50 mg tablet Take 50 mg by mouth nightly.  albuterol (VENTOLIN HFA) 90 mcg/actuation inhaler Take 2 Puffs by inhalation two (2) times daily as needed for Wheezing.  riboflavin, vitamin B2, (VITAMIN B-2) 100 mg tablet Take 100 mg by mouth daily.  OTHER Supplement drink. Drinks one po once daily.  atorvastatin (LIPITOR) 20 mg tablet Take 20 mg by mouth daily.  budesonide-formoterol (SYMBICORT) 80-4.5 mcg/actuation HFAA inhaler Take 2 Puffs by inhalation two (2) times a day.  ASPIRIN PO Take 81 mg by mouth daily. Indications: stopped yesterday for upcoming urology procedure 19. No current facility-administered medications for this visit. Allergies   Allergen Reactions    Lisinopril Cough    Other Medication Rash     Vaccine for polio caused a rash.      Past Medical History:   Diagnosis Date    Aortic atherosclerosis (HCC)     Arthritis     Chronic obstructive pulmonary disease (HCC)     emphysema    Depression     History of left shoulder fracture     Hyperlipidemia     Hypertension     Ill-defined condition     hx dislocated left shoulder    Lumbar spinal stenosis     PT/injection/accupunture/exercise [employer] 2 tmes a week    [facility] ([facility]     Shingles     Stroke ([facility] 2002    no residual problems     Past Surgical History:   Procedure Laterality Date    HX CATARACT REMOVAL Bilateral     HX ORTHOPAEDIC      steroid injections in back d/t spinal stenosis    HX TONSILLECTOMY       Social History     Socioeconomic History    Marital status: UNKNOWN     Spouse name: Not on file    Number of children: Not on file    Years of education: Not on file    Highest education level: Not on file   Occupational History    Not on file   Social Needs    Financial resource strain: Not on file    Food insecurity:     Worry: Not on file     Inability: Not on file    Transportation needs:     Medical: Not on file     Non-medical: Not on file   Tobacco Use    Smoking status: Former Smoker    Smokeless tobacco: Never Used    Tobacco comment: quit 31 yrs ago   Substance and Sexual Activity    Alcohol use:  Yes     Alcohol/week: 7.0 standard drinks     Types: 7 Glasses of wine per week     Comment: wine with dinner/mixed drink    Drug use: No    Sexual activity: Not on file   Lifestyle    Physical activity:     Days per week: Not on file     Minutes per session: Not on file    Stress: Not on file   Relationships    Social connections:     Talks on phone: Not on file     Gets together: Not on file     Attends L[employer]-day service: Not on file     Active member of club or organization: Not on file     Attends meetings of clubs or organizations: Not on file     Relationship status: Not on file    Intimate partner violence:     Fear of current or ex partner: Not on file     Emotionally abused: Not on file     Physically abused: Not on file     Forced sexual activity: Not on file   Other Topics Concern    Not on file   Social History Narrative    Lives in [place] alone, does not drive     Family History   Problem Relation Age of Onset    Other Father         kidney removed - unsure of reason, was estranged    Cancer Maternal Aunt         ? where    Heart Attack Paternal Uncle     Cancer Maternal Grandmother         ? where    Kidney Disease Maternal Aunt     Cancer Cousin         ? where - 35s    Heart Attack Paternal Uncle     Kidney Disease Cousin         kidney failure    Alcohol abuse Cousin     Dementia Mother         she is currently 81yo    No Known Problems Daughter        PHYSICAL EXAMINATION:    Visit Vitals  BP (!) 72/42 (BP 1 Location: Left arm, BP Patient Position: Standing)   Pulse 94   Temp 97.3 °F (36.3 °C) (Oral)   Resp 14   Ht 6' (1.829 m)   Wt 61.3 kg (135 lb 3.2 oz)   SpO2 95%   BMI 18.34 kg/m²     General:  Well defined, nourished, and groomed individual in no acute distress. Neck: Supple, nontender, no bruits, no pain with resistance to active range of motion. Heart: Regular rate and rhythm, no murmurs, rub, or gallop. Normal S1S2. Musculoskeletal:  Extremities revealed no edema and had full range of motion of joints. Psych:  Good mood and bright affect    NEUROLOGICAL EXAMINATION:     Mental Status:   Alert and oriented to person, place, and time with recent and remote memory intact. Attention span and concentration are normal. Speech is slow and soft    Cranial Nerves:    II, III, IV, VI:  Visual acuity grossly intact. Visual fields are normal.    Pupils are equal, round, and reactive to light and accommodation. Extra-ocular movements are full and fluid. Fundoscopic exam was benign, no ptosis or nystagmus. V-XII: Hearing is grossly intact. Facial features are symmetric, with normal sensation and strength.   The palate rises symmetrically and the tongue protrudes midline. Sternocleidomastoids 5/5. Motor Examination: Normal tone, bulk, and strength, 5/5 muscle strength throughout. No cogwheel rigidity or clonus present. Coordination:  Heel-to-shin was smooth and symmetrical bilaterally. Finger to nose and rapid arm movement testing was normal.   No resting or intention tremor    Gait and Station:Slight shuffling, does not lift right leg as well as left, dec arm swing in right arm, stooped posture, extra step to turn. +Pull test    Reflexes:  DTRs 2+ throughout. Toes downgoing. ASSESSMENT AND PLAN    ICD-10-CM ICD-9-CM    1. [facility]'s [facility] ([facility] G20 332.0    Pt is a 68 y.o. right-handed male diagnosed with PD. Currently on Sinemet  mg 1 tab 3 times a day. Questionable NOH Neurogenic orthostatic hypotension. On exam, he had orthostatic blood pressure. He denies symptoms. He has not had much to drink today. Dopaminergic and other medications can cause lower SBP. We discussed tracking blood pressure. Discuss with PCP about decreasing metoprolol. Will continue to monitor if symptoms start or blood pressure does not increase after medication adjustment, we may add medication to treat [address]. 1.Sinmet  mg 1 tab  3 times a day  2. Track blood pressures  This note will not be viewable in [employer].   Jerri Cordova NP

## 2019-08-01 NOTE — PROGRESS NOTES
Josefa Wade is a 68 y.o. male  Chief Complaint   Patient presents with    Neurologic Problem     Visit Vitals  BP (!) 72/42 (BP 1 Location: Left arm, BP Patient Position: Standing)   Pulse 94   Temp 97.3 °F (36.3 °C) (Oral)   Resp 14   Ht 6' (1.829 m)   Wt 61.3 kg (135 lb 3.2 oz)   SpO2 95%   BMI 18.34 kg/m²

## 2019-08-21 ENCOUNTER — HOSPITAL ENCOUNTER (OUTPATIENT)
Dept: CT IMAGING | Age: 69
Discharge: HOME | End: 2019-08-21
Attending: UROLOGY
Payer: MEDICARE

## 2019-08-21 DIAGNOSIS — C61 PROSTATE CANCER (HCC): ICD-10-CM

## 2019-08-21 LAB — CREAT BLD-MCNC: 1.1 MG/DL (ref 0.6–1.3)

## 2019-08-21 PROCEDURE — 74011636320 HC RX REV CODE- 636/320: Performed by: UROLOGY

## 2019-08-21 PROCEDURE — 74177 CT ABD & PELVIS W/CONTRAST: CPT

## 2019-08-21 PROCEDURE — 82565 ASSAY OF CREATININE: CPT

## 2019-08-21 RX ORDER — SODIUM CHLORIDE 0.9 % (FLUSH) 0.9 %
10 SYRINGE (ML) INJECTION
Status: COMPLETED | OUTPATIENT
Start: 2019-08-21 | End: 2019-08-21

## 2019-08-21 RX ADMIN — IOPAMIDOL 100 ML: 755 INJECTION, SOLUTION INTRAVENOUS at 13:27

## 2019-08-21 RX ADMIN — Medication 10 ML: at 13:27

## 2019-09-04 ENCOUNTER — HOSPITAL ENCOUNTER (OUTPATIENT)
Dept: NUCLEAR MEDICINE | Age: 69
Discharge: HOME | End: 2019-09-04
Attending: UROLOGY
Payer: MEDICARE

## 2019-09-04 DIAGNOSIS — C61 PROSTATE CANCER (HCC): ICD-10-CM

## 2019-09-04 PROCEDURE — 78306 BONE IMAGING WHOLE BODY: CPT

## 2019-11-21 ENCOUNTER — OFFICE VISIT (OUTPATIENT)
Dept: NEUROLOGY | Age: 69
End: 2019-11-21

## 2019-11-21 VITALS
RESPIRATION RATE: 14 BRPM | HEIGHT: 72 IN | HEART RATE: 88 BPM | DIASTOLIC BLOOD PRESSURE: 62 MMHG | WEIGHT: 133 LBS | OXYGEN SATURATION: 95 % | BODY MASS INDEX: 18.01 KG/M2 | SYSTOLIC BLOOD PRESSURE: 104 MMHG

## 2019-11-21 DIAGNOSIS — G20 PARKINSON'S DISEASE (HCC): Primary | ICD-10-CM

## 2019-11-21 RX ORDER — CARBIDOPA AND LEVODOPA 25; 250 MG/1; MG/1
1 TABLET ORAL 3 TIMES DAILY
Qty: 90 TAB | Refills: 6 | Status: SHIPPED | OUTPATIENT
Start: 2019-11-21 | End: 2020-06-29

## 2019-11-21 NOTE — PROGRESS NOTES
Sarah Peters is a 68 y.o. male presenting with Parkinsons Disease        No flowsheet data found. Follow up visit for parkinson's. He is maintained on Sinemet 25-250mg and is tolerating this without significant side effects. He has no concerns. He notes he is doing well  Symptoms:   Tremors/Shaking   Muscle stiffness (rigidity): none  Problems with balance, shuffling walk, falls: none  Slowness (bradykinesia): none  Freezing or wearing off, off time:none  Soft voice, issues swallowing:yes  Sudden, erratic movements (Dyskinesias): none   Memory loss, difficulty thinking or remembering clearly: yes   Depression/anxiety: No  Difficulty sleeping, issues with talking in sleep or acting out dreams: no  Constipation: no  Hallucinations/delusions: NO  Nausea: None   Lightheadedness, positional dizziness;None   Compulsive behaviors/urges None   Morning akinesia : NO                 Review of Systems   Constitutional: Negative. HENT: Negative. Eyes: Negative for blurred vision and double vision. Respiratory: Negative. Cardiovascular: Negative. Negative for chest pain and palpitations. Musculoskeletal: Negative. Neurological: Positive for tremors. Psychiatric/Behavioral: Positive for memory loss. Negative for depression, substance abuse and suicidal ideas. The patient is not nervous/anxious.         Past Medical History:   Diagnosis Date    Aortic atherosclerosis ([address]     Arthritis     Chronic obstructive pulmonary disease (HCC)     emphysema    Depression     History of left shoulder fracture     Hyperlipidemia     Hypertension     Ill-defined condition     hx dislocated left shoulder    Lumbar spinal stenosis     PT/injection/accupunture/exercise [employer] 2 tmes a week    Parkinson's disease ([address]     Shingles     Stroke ([address] 2002    no residual problems       Past Surgical History:   Procedure Laterality Date    HX CATARACT REMOVAL Bilateral     HX ORTHOPAEDIC      steroid injections in back d/t spinal stenosis    HX TONSILLECTOMY         Allergies   Allergen Reactions    Lisinopril Cough    Other Medication Rash     Vaccine for polio caused a rash. Current Outpatient Medications   Medication Sig Dispense Refill    carbidopa-levodopa (SINEMET)  mg per tablet Take 1 Tab by mouth three (3) times daily. 90 Tab 6    citalopram (CELEXA) 10 mg tablet Take 10 mg by mouth daily.  ondansetron hcl (ZOFRAN) 8 mg tablet Take 8 mg by mouth three (3) times daily as needed for Nausea.  pantoprazole (PROTONIX) 40 mg tablet Take 40 mg by mouth daily.  tamsulosin HCl (TAMSULOSIN PO) Take 0.4 mg by mouth nightly. tamsulosin ER      traZODone (DESYREL) 50 mg tablet Take 50 mg by mouth nightly.  albuterol (VENTOLIN HFA) 90 mcg/actuation inhaler Take 2 Puffs by inhalation two (2) times daily as needed for Wheezing.  riboflavin, vitamin B2, (VITAMIN B-2) 100 mg tablet Take 100 mg by mouth daily.  OTHER Supplement drink. Drinks one po once daily.  atorvastatin (LIPITOR) 20 mg tablet Take 20 mg by mouth daily.  budesonide-formoterol (SYMBICORT) 80-4.5 mcg/actuation HFAA inhaler Take 2 Puffs by inhalation two (2) times a day.  ASPIRIN PO Take 81 mg by mouth daily. Indications: stopped yesterday for upcoming urology procedure 8-8-19.  metoprolol tartrate (LOPRESSOR) 25 mg tablet Take 25 mg by mouth daily.          Social History     Socioeconomic History    Marital status: UNKNOWN     Spouse name: Not on file    Number of children: Not on file    Years of education: Not on file    Highest education level: Not on file   Occupational History    Not on file   Social Needs    Financial resource strain: Not on file    Food insecurity:     Worry: Not on file     Inability: Not on file    Transportation needs:     Medical: Not on file     Non-medical: Not on file   Tobacco Use    Smoking status: Former Smoker    Smokeless tobacco: Never Used    Tobacco comment: quit 31 yrs ago   Substance and Sexual Activity    Alcohol use: Yes     Alcohol/week: 7.0 standard drinks     Types: 7 Glasses of wine per week     Comment: wine with dinner/mixed drink    Drug use: No    Sexual activity: Not Currently   Lifestyle    Physical activity:     Days per week: Not on file     Minutes per session: Not on file    Stress: Not on file   Relationships    Social connections:     Talks on phone: Not on file     Gets together: Not on file     Attends Congregational service: Not on file     Active member of club or organization: Not on file     Attends meetings of clubs or organizations: Not on file     Relationship status: Not on file    Intimate partner violence:     Fear of current or ex partner: Not on file     Emotionally abused: Not on file     Physically abused: Not on file     Forced sexual activity: Not on file   Other Topics Concern    Not on file   Social History Narrative    Lives in [facility] alone, does not drive       Family History   Problem Relation Age of Onset    Other Father         kidney removed - unsure of reason, was estranged    Cancer Maternal Aunt         ? where    Heart Attack Paternal Uncle     Cancer Maternal Grandmother         ? where    Kidney Disease Maternal Aunt     Cancer Cousin         ? where - 35s    Heart Attack Paternal Uncle     Kidney Disease Cousin         kidney failure    Alcohol abuse Cousin     Dementia Mother         she is currently 79yo    No Known Problems Daughter        Vitals:    11/21/19 1409   BP: 104/62   Pulse: 88   Resp: 14   SpO2: 95%   Weight: 60.3 kg (133 lb)   Height: 6' (1.829 m)        Neurologic Exam     Mental Status   Oriented to person, place, and time. Cranial Nerves     CN II   Visual fields full to confrontation. CN III, IV, VI   Pupils are equal, round, and reactive to light. Extraocular motions are normal.   Right pupil: Shape: regular.    Nystagmus: none   Diplopia: none    Motor Exam Muscle bulk: normal  Overall muscle tone: normal    Strength   Strength 5/5 throughout. Right neck flexion: 4/5  Left neck flexion: 4/5  Right neck extension: 4/5  Left neck extension: 4/5  Right deltoid: 4/5  Left deltoid: 4/5  Right biceps: 4/5  Left biceps: 4/5  Right triceps: 4/5  Left triceps: 4/5  Right wrist flexion: 4/5  Left wrist flexion: 4/5  Right wrist extension: 4/5  Left wrist extension: 4/5  Right interossei: 4/5  Left interossei: 4/5  Right abdominals: 4/5  Left abdominals: 4/5  Right iliopsoas: 4/5  Left iliopsoas: 4/5  Right quadriceps: 4/5  Left quadriceps: 4/5  Right hamstrin/5  Left hamstrin/5  Right glutei: 4/5  Left glutei: 4/5  Right anterior tibial: 4/5  Left anterior tibial: 4/5  Right posterior tibial: 4/5  Left posterior tibial: 4/5  Right peroneal: 4/5  Left peroneal: 4/5  Right gastroc: 4/5  Left gastroc: 4/5    Gait, Coordination, and Reflexes     Coordination   Finger to nose coordination: normal    Tremor   Resting tremor: present  Intention tremor: present  Action tremor: right arm and left arm    Reflexes   Right brachioradialis: 2+  Left brachioradialis: 2+  Right biceps: 2+  Left biceps: 2+  Right triceps: 2+  Left triceps: 2+  Right patellar: 2+  Left patellar: 2+  Right achilles: 2+  Left achilles: 2+  Right : 2+  Left : 2+      Physical Exam  Eyes:      Extraocular Movements: EOM normal.      Pupils: Pupils are equal, round, and reactive to light. Skin:     General: Skin is warm and dry. Neurological:      Mental Status: He is alert and oriented to person, place, and time. Coordination: Finger-Nose-Finger Test normal.      Deep Tendon Reflexes: Strength normal.      Reflex Scores:       Tricep reflexes are 2+ on the right side and 2+ on the left side. Bicep reflexes are 2+ on the right side and 2+ on the left side. Brachioradialis reflexes are 2+ on the right side and 2+ on the left side.        Patellar reflexes are 2+ on the right side and 2+ on the left side. Achilles reflexes are 2+ on the right side and 2+ on the left side. Psychiatric:         Mood and Affect: Mood normal.         Behavior: Behavior normal.         Thought Content: Thought content normal.         Judgment: Judgment normal.         Assessment and Plan:   1. Parkinson's disease ([address]  Doing well    continue Sinemet as Prescribed. Follow up 6 months  This note will not be viewable in MyChart.   Marilyn Ackerman NP

## 2019-11-21 NOTE — PROGRESS NOTES
Pt here to follow up Angelika's. He is still dragging his foot when he walks. He thinks he Is doing pretty good.

## 2020-02-19 ENCOUNTER — ANESTHESIA EVENT (OUTPATIENT)
Dept: ENDOSCOPY | Age: 70
End: 2020-02-19
Payer: MEDICARE

## 2020-02-19 ENCOUNTER — HOSPITAL ENCOUNTER (OUTPATIENT)
Age: 70
Setting detail: OUTPATIENT SURGERY
Discharge: HOME | End: 2020-02-19
Attending: INTERNAL MEDICINE | Admitting: INTERNAL MEDICINE
Payer: MEDICARE

## 2020-02-19 ENCOUNTER — ANESTHESIA (OUTPATIENT)
Dept: ENDOSCOPY | Age: 70
End: 2020-02-19
Payer: MEDICARE

## 2020-02-19 VITALS
OXYGEN SATURATION: 99 % | BODY MASS INDEX: 18.58 KG/M2 | RESPIRATION RATE: 21 BRPM | WEIGHT: 137 LBS | TEMPERATURE: 97.7 F | DIASTOLIC BLOOD PRESSURE: 95 MMHG | HEART RATE: 73 BPM | SYSTOLIC BLOOD PRESSURE: 136 MMHG

## 2020-02-19 PROCEDURE — 74011250636 HC RX REV CODE- 250/636: Performed by: INTERNAL MEDICINE

## 2020-02-19 PROCEDURE — 74011250636 HC RX REV CODE- 250/636: Performed by: NURSE ANESTHETIST, CERTIFIED REGISTERED

## 2020-02-19 PROCEDURE — 74011000250 HC RX REV CODE- 250: Performed by: NURSE ANESTHETIST, CERTIFIED REGISTERED

## 2020-02-19 PROCEDURE — 76060000032 HC ANESTHESIA 0.5 TO 1 HR: Performed by: INTERNAL MEDICINE

## 2020-02-19 PROCEDURE — 76040000007: Performed by: INTERNAL MEDICINE

## 2020-02-19 RX ORDER — PROPOFOL 10 MG/ML
INJECTION, EMULSION INTRAVENOUS AS NEEDED
Status: DISCONTINUED | OUTPATIENT
Start: 2020-02-19 | End: 2020-02-19 | Stop reason: HOSPADM

## 2020-02-19 RX ORDER — FLUMAZENIL 0.1 MG/ML
0.2 INJECTION INTRAVENOUS
Status: DISCONTINUED | OUTPATIENT
Start: 2020-02-19 | End: 2020-02-19 | Stop reason: HOSPADM

## 2020-02-19 RX ORDER — NALOXONE HYDROCHLORIDE 0.4 MG/ML
0.4 INJECTION, SOLUTION INTRAMUSCULAR; INTRAVENOUS; SUBCUTANEOUS
Status: DISCONTINUED | OUTPATIENT
Start: 2020-02-19 | End: 2020-02-19 | Stop reason: HOSPADM

## 2020-02-19 RX ORDER — SODIUM CHLORIDE 9 MG/ML
50 INJECTION, SOLUTION INTRAVENOUS CONTINUOUS
Status: DISCONTINUED | OUTPATIENT
Start: 2020-02-19 | End: 2020-02-19 | Stop reason: HOSPADM

## 2020-02-19 RX ORDER — SODIUM CHLORIDE 0.9 % (FLUSH) 0.9 %
5-40 SYRINGE (ML) INJECTION EVERY 8 HOURS
Status: DISCONTINUED | OUTPATIENT
Start: 2020-02-19 | End: 2020-02-19 | Stop reason: HOSPADM

## 2020-02-19 RX ORDER — ATROPINE SULFATE 0.1 MG/ML
0.5 INJECTION INTRAVENOUS
Status: DISCONTINUED | OUTPATIENT
Start: 2020-02-19 | End: 2020-02-19 | Stop reason: HOSPADM

## 2020-02-19 RX ORDER — EPINEPHRINE 0.1 MG/ML
1 INJECTION INTRACARDIAC; INTRAVENOUS
Status: DISCONTINUED | OUTPATIENT
Start: 2020-02-19 | End: 2020-02-19 | Stop reason: HOSPADM

## 2020-02-19 RX ORDER — SODIUM CHLORIDE 0.9 % (FLUSH) 0.9 %
5-40 SYRINGE (ML) INJECTION AS NEEDED
Status: DISCONTINUED | OUTPATIENT
Start: 2020-02-19 | End: 2020-02-19 | Stop reason: HOSPADM

## 2020-02-19 RX ORDER — DEXTROMETHORPHAN/PSEUDOEPHED 2.5-7.5/.8
1.2 DROPS ORAL
Status: DISCONTINUED | OUTPATIENT
Start: 2020-02-19 | End: 2020-02-19 | Stop reason: HOSPADM

## 2020-02-19 RX ORDER — SODIUM CHLORIDE 9 MG/ML
INJECTION, SOLUTION INTRAVENOUS
Status: DISCONTINUED | OUTPATIENT
Start: 2020-02-19 | End: 2020-02-19 | Stop reason: HOSPADM

## 2020-02-19 RX ORDER — LIDOCAINE HYDROCHLORIDE 20 MG/ML
INJECTION, SOLUTION EPIDURAL; INFILTRATION; INTRACAUDAL; PERINEURAL AS NEEDED
Status: DISCONTINUED | OUTPATIENT
Start: 2020-02-19 | End: 2020-02-19 | Stop reason: HOSPADM

## 2020-02-19 RX ADMIN — SODIUM CHLORIDE: 900 INJECTION, SOLUTION INTRAVENOUS at 11:29

## 2020-02-19 RX ADMIN — LIDOCAINE HYDROCHLORIDE 40 MG: 20 INJECTION, SOLUTION EPIDURAL; INFILTRATION; INTRACAUDAL; PERINEURAL at 11:48

## 2020-02-19 RX ADMIN — PROPOFOL 50 MG: 10 INJECTION, EMULSION INTRAVENOUS at 12:02

## 2020-02-19 RX ADMIN — PROPOFOL 100 MG: 10 INJECTION, EMULSION INTRAVENOUS at 11:48

## 2020-02-19 RX ADMIN — PROPOFOL 50 MG: 10 INJECTION, EMULSION INTRAVENOUS at 12:05

## 2020-02-19 RX ADMIN — PROPOFOL 50 MG: 10 INJECTION, EMULSION INTRAVENOUS at 11:57

## 2020-02-19 NOTE — DISCHARGE INSTRUCTIONS
[address]  [address]          Artie Payor  [id number]  1950    COLON DISCHARGE INSTRUCTIONS    DISCOMFORT:  Redness at IV site- apply warm compress to area; if redness or soreness persist- contact your physician  There may be a slight amount of blood passed from the rectum  Gaseous discomfort- walking, belching will help relieve any discomfort  You may not operate a vehicle for 12 hours  You may not engage in an occupation involving machinery or appliances for rest of today  You may not drink alcoholic beverages for at least 12 hours  Avoid making any critical decisions for at least 24 hour  DIET:   Regular diet. - however -  remember your colon is empty and a heavy meal will produce gas. Avoid these foods:  vegetables, fried / greasy foods, carbonated drinks for today         ACTIVITY:  You may resume your normal daily activities it is recommended that you spend the remainder of the day resting -  avoid any strenuous activity. CALL M.D. ANY SIGN OF:   Increasing pain, nausea, vomiting  Abdominal distension (swelling)  New increased bleeding (oral or rectal)  Fever (chills)  Pain in chest area  Bloody discharge from nose or mouth  Shortness of breath     Follow-up Instructions:   Call Dr. Didier Zhang for any questions or problems. Telephone # [phone]  Should have a repeat colonoscopy in 10 years    Impression:  mild diverticulosis  Patient Education        Diverticulosis: Care Instructions  Your Care Instructions  In diverticulosis, pouches called diverticula form in the wall of the large intestine (colon). The pouches do not cause any pain or other symptoms. Most people who have diverticulosis do not know they have it. But the pouches sometimes bleed, and if they become infected, they can cause pain and other symptoms. When this happens, it is called diverticulitis.   Diverticula form when pressure pushes the wall of the colon outward at certain weak points. A diet that is too low in fiber can cause diverticula. Follow-up care is a key part of your treatment and safety. Be sure to make and go to all appointments, and call your doctor if you are having problems. It's also a good idea to know your test results and keep a list of the medicines you take. How can you care for yourself at home? · Include fruits, leafy green vegetables, beans, and whole grains in your diet each day. These foods are high in fiber. · Take a fiber supplement, such as Citrucel or Metamucil, every day if needed. Read and follow all instructions on the label. · Drink plenty of fluids, enough so that your urine is light yellow or clear like water. If you have kidney, heart, or liver disease and have to limit fluids, talk with your doctor before you increase the amount of fluids you drink. · Get at least 30 minutes of exercise on most days of the week. Walking is a good choice. You also may want to do other activities, such as running, swimming, cycling, or playing tennis or team sports. · Cut out foods that cause gas, pain, or other symptoms. When should you call for help? Call your doctor now or seek immediate medical care if:    · You have belly pain.     · You pass maroon or very bloody stools.     · You have a fever.     · You have nausea and vomiting.     · You have unusual changes in your bowel movements or abdominal swelling.     · You have burning pain when you urinate.     · You have abnormal vaginal discharge.     · You have shoulder pain.     · You have cramping pain that does not get better when you have a bowel movement or pass gas.     · You pass gas or stool from your urethra while urinating.    Watch closely for changes in your health, and be sure to contact your doctor if you have any problems. Where can you learn more? Go to http://mando-shilpi.info/.   Enter F826 in the search box to learn more about \"Diverticulosis: Care Instructions. \"  Current as of: November 7, 2018  Content Version: 12.2  © 8997-2759 [employer]. Care instructions adapted under license by [employer] (which disclaims liability or warranty for this information). If you have questions about a medical condition or this instruction, always ask your healthcare professional. [address] any warranty or liability for your use of this information.

## 2020-02-19 NOTE — ROUTINE PROCESS
Dickson Sinclair  1950  [id number]    Situation:  Verbal report received from: Inaja  Procedure: Procedure(s):  COLONOSCOPY    Background:    Preoperative diagnosis: SCREENING  Postoperative diagnosis: mild diverticulosis    :  Dr. Deepika Cortes  Assistant(s): Endoscopy Technician-1: Dara Fuller  Endoscopy RN-1: Trixie Vides RN    Specimens:   H. Pylori      Assessment:  Intra-procedure medications   Anesthesia gave intra-procedure sedation and medications, see anesthesia flow sheet yes    Intravenous fluids: NS@ KVO     Vital signs stable yes    Abdominal assessment: round and soft yes    Recommendation:  Discharge patient per MD order yes.   Return to floor  Family or Friend yes  Permission to share finding with family or friend yes

## 2020-02-19 NOTE — ANESTHESIA POSTPROCEDURE EVALUATION
Procedure(s):  COLONOSCOPY. MAC    Anesthesia Post Evaluation        Patient location during evaluation: PACU  Patient participation: complete - patient participated  Level of consciousness: awake  Pain management: adequate  Airway patency: patent  Anesthetic complications: no  Cardiovascular status: hemodynamically stable  Respiratory status: acceptable  Hydration status: acceptable  Comments: I have seen and evaluated the patient. The patient is ready for PACU discharge. [address], DO                         Vitals Value Taken Time   BP 87/59 2/19/2020 12:20 PM   Temp     Pulse 70 2/19/2020 12:21 PM   Resp 19 2/19/2020 12:21 PM   SpO2 99 % 2/19/2020 12:21 PM   Vitals shown include unvalidated device data.

## 2020-02-19 NOTE — PROCEDURES
[address]  [address]         Procedure:  Colonoscopy    :  Royal Althea MD    Surgical Assistant: None    Implants: None    Referring Provider: Jonatan Godwin MD    Sedation:  MAC anesthesia Propofol      Prior to the procedure its objectives, risks, consequences and alternatives were discussed with the patient who then elected to proceed. The patient had the opportunity to ask questions and those questions were answered. A physical exam was performed. The heart, lungs, and mental status were examined prior to the procedure and found to be satisfactory for conscious sedation and for the procedure. Conscious sedation was initiated by the physician. Continuous pulse oximetry and blood pressure monitoring were used throughout the procedure. After appropriate analgesia and rectal exam, the colonoscope was passed into the anus and passed to the cecum without difficulty. The prep was good. On slow withdrawal of the scope, the cecum, ascending, colon, hepatic flexure, transverse colon, splenic flexure and descending colon were normal. In the sigmoid there were mild diverticulosis. The rectum was normal. Retroflexion exam of the rectum was normal He tolerated the procedure without complication and I recommend a repeat colonoscopy in 10 years. Specimen Removed:  None    Complications: None. EBL:  None.         Royal Althea MD  2/19/2020  12:14 PM

## 2020-02-19 NOTE — H&P
[address], [address] [place] [address] Pkw[address]                 Jim Uribe is a  68 y.o.  male who presents with a 40 pound weight loss and negative EGD . Past Medical History:   Diagnosis Date    Aortic atherosclerosis ([facility]     Arthritis     Chronic obstructive pulmonary disease (HCC)     emphysema    Depression     History of left shoulder fracture     Hyperlipidemia     Hypertension     Ill-defined condition     hx dislocated left shoulder    Ill-defined condition     prostate ca/ seed implant    Lumbar spinal stenosis     PT/injection/accupunture/exercise [facility] 2 tmes a week    Parkinson's disease ([facility]     Shingles     Stroke ([facility] 2002    no residual problems     Past Surgical History:   Procedure Laterality Date    HX CATARACT REMOVAL Bilateral     HX ORTHOPAEDIC      steroid injections in back d/t spinal stenosis    HX TONSILLECTOMY       Allergies   Allergen Reactions    Lisinopril Cough    Other Medication Rash     Vaccine for polio caused a rash.      Current Facility-Administered Medications   Medication Dose Route Frequency Provider Last Rate Last Dose    0.9% sodium chloride infusion  50 mL/hr IntraVENous CONTINUOUS Vince Landaverde MD 50 mL/hr at 02/19/20 1120 50 mL/hr at 02/19/20 1120    sodium chloride (NS) flush 5-40 mL  5-40 mL IntraVENous Q8H Vicne Landaverde MD        sodium chloride (NS) flush 5-40 mL  5-40 mL IntraVENous PRN Vince Landaverde MD        naloxone [facility]) injection 0.4 mg  0.4 mg IntraVENous Multiple Vince Landaverde MD        flumazeniL (ROMAZICON) 0.1 mg/mL injection 0.2 mg  0.2 mg IntraVENous Multiple Vince Landaverde MD        [facility]) 28HY/7.1YI oral drops 80 mg  1.2 mL Oral Multiple Vince Landaverde MD        atropine injection 0.5 mg  0.5 mg IntraVENous ONCE PRN Vince Landaverde MD        EPINEPHrine (ADRENALIN) 0.1 mg/mL syringe 1 mg  1 mg Endoscopically ONCE PRN Vince Landaverde MD         Facility-Administered Medications Ordered in Other Encounters   Medication Dose Route Frequency Provider Last Rate Last Dose    0.9% sodium chloride infusion   IntraVENous CONTINUOUS Chasity Toribio CRNA           Visit Vitals  /90   Pulse 81   Temp 97.9 °F (36.6 °C)   Resp 18   Wt 62.1 kg (137 lb)   SpO2 99%   BMI 18.58 kg/m²           PHYSICAL EXAM:  General: WD, WN. Alert, cooperative, no acute distress    HEENT: NC, Atraumatic. PERRLA, EOMI. Anicteric sclerae. Mallampati score 2  Lungs:  CTA Bilaterally. No Wheezing/Rhonchi/Rales. Heart:  Regular  rhythm,  No murmur (), No Rubs, No Gallops  Abdomen: Soft, Non distended, Non tender.  +Bowel sounds, no HSM  Extremities: No c/c/e  Neurologic:  CN 2-12 gi, Alert and oriented X 3. No acute neurological distress   Psych:   Good insight. Not anxious nor agitated. Plan:   Endoscopic procedure with MAC.     Royal Althea MD  2/19/2020  11:47 AM

## 2020-06-04 ENCOUNTER — OFFICE VISIT (OUTPATIENT)
Dept: NEUROLOGY | Age: 70
End: 2020-06-04

## 2020-06-04 VITALS
WEIGHT: 137 LBS | HEART RATE: 69 BPM | HEIGHT: 72 IN | SYSTOLIC BLOOD PRESSURE: 100 MMHG | BODY MASS INDEX: 18.56 KG/M2 | DIASTOLIC BLOOD PRESSURE: 60 MMHG | OXYGEN SATURATION: 98 % | RESPIRATION RATE: 16 BRPM

## 2020-06-04 DIAGNOSIS — G20 PARKINSON'S DISEASE (HCC): Primary | ICD-10-CM

## 2020-06-04 NOTE — LETTER
6/4/20 Patient: Denia Gaston YOB: 1950 Date of Visit: 6/4/2020 Kristin Cleary MD 
[address] [facility] [address] VIA Facsimile: [phone] Dear Kristin Cleary MD, Thank you for referring Mr. Dale Knapp to [address] [place] [address] for evaluation. My notes for this consultation are attached. If you have questions, please do not hesitate to call me. I look forward to following your patient along with you. Sincerely, Buddy Marion MD

## 2020-06-04 NOTE — PROGRESS NOTES
Mr. Jess Hawkins presents today to follow up Parkinson's. He reports tremor of right hand, memory loss and occasional dragging of left foot. Depression screening done on patient.

## 2020-06-04 NOTE — PROGRESS NOTES
Neurology Consult Note      HISTORY PROVIDED BY: patient    Chief Complaint:   Chief Complaint   Patient presents with    Parkinsons Disease      Subjective:   Pt is a 65 y.o. right handed male initially and last seen in clinic on 5/31/19, diagnosed with PD in 2008, reporting an action tremor, drags his right foot, has hypophonia, no freezing. No swallowing difficulty, no NOH, no constipation. no hallucinations. Only taking Carbidopa-levodopa 25-250mg only 1 tab a day. Exam reveals hypophonia slight shuffling does not lift right leg fully, decreased arm swing on the right, stooped posture, extra step to turn, positive pull test.  No resting tremor seen. Patient does have findings suggestive of Parkinson's, primary versus secondary. Discussed diagnosis and treatment, and goals of treatment. Increased carbidopa levodopa 25-250mg, 1 tab tid. He returns for f/u. Since our last visit, he was seen by the NP on 8/1/19 and c/o drop in BP, suggested he discuss decreasing metoprolol with PCP. He was seen again by NP on 11/21/19 and no changes were made to his medication. He had prostate cancer surgery in January. His mother passed away in January at 79yo. He is off of metoprolol now. His SBPs in 100's, no lightheadedness with standing, no falls. He is still taking Sinemet 25-250mg tid. Will have shaking when eating with a fork. He has noticed shuffling at the store, no freezing. Denies wearing off. Feels like he has some memory difficulty, describes difficulty recalling names, but later comes to him. No trouble with finances, not forgetting any thing significant.      Symptoms:   Tremors/Shaking Yes  Muscle stiffness (rigidity): none  Problems with balance, shuffling walk, falls: no falls, +shuffling  Slowness (bradykinesia): none  Freezing or wearing off, off time:none  Soft voice, issues swallowing: +soft voice, no swallowing issues  Sudden, erratic movements (Dyskinesias): none   Memory loss, difficulty thinking or remembering clearly: yes - likely normal for age  Depression/anxiety: No  Difficulty sleeping, issues with talking in sleep or acting out dreams: no  Constipation: no  Hallucinations/delusions: No  Nausea: None   Lightheadedness, positional dizziness;None   Compulsive behaviors/urges None   Morning akinesia : No         Past Medical History:   Diagnosis Date    Aortic atherosclerosis ([facility] [address]     Arthritis     Chronic obstructive pulmonary disease (HCC)     emphysema    Depression     History of left shoulder fracture     Hyperlipidemia     Hypertension     Ill-defined condition     hx dislocated left shoulder    Ill-defined condition     prostate ca/ seed implant    Lumbar spinal stenosis     PT/injection/accupunture/exercise [facility] 2 tmes a week    Parkinson's disease ([facility] [address]     Shingles     Stroke ([facility] [address] 2002    no residual problems      Past Surgical History:   Procedure Laterality Date    COLONOSCOPY N/A 2/19/2020    COLON[facility] performed by [facility]by Tabor [facility] at [facility] ENDOSCOPY    HX CATARACT REMOVAL Bilateral     HX ORTHOPAEDIC      steroid injections in back d/t spinal stenosis    HX TONSILLECTOMY        Social History     Socioeconomic History    Marital status: UNKNOWN     Spouse name: Not on file    Number of children: Not on file    Years of education: Not on file    Highest education level: Not on file   Occupational History    Not on file   Social Needs    Financial resource strain: Not on file    Food insecurity     Worry: Not on file     Inability: Not on file    Transportation needs     Medical: Not on file     Non-medical: Not on file   Tobacco Use    Smoking status: Former Smoker    Smokeless tobacco: Never Used    Tobacco comment: quit 31 yrs ago   Substance and Sexual Activity    Alcohol use:  Yes     Alcohol/week: 7.0 standard drinks     Types: 7 Glasses of wine per week     Comment: wine with dinner/mixed drink    Drug use: No    Sexual activity: Not Currently Lifestyle    Physical activity     Days per week: Not on file     Minutes per session: Not on file    Stress: Not on file   Relationships    Social connections     Talks on phone: Not on file     Gets together: Not on file     Attends Yarsani service: Not on file     Active member of club or organization: Not on file     Attends meetings of clubs or organizations: Not on file     Relationship status: Not on file    Intimate partner violence     Fear of current or ex partner: Not on file     Emotionally abused: Not on file     Physically abused: Not on file     Forced sexual activity: Not on file   Other Topics Concern    Not on file   Social History Narrative    Lives in [place] alone, does not drive     Family History   Problem Relation Age of Onset    Other Father         kidney removed - unsure of reason, was estranged    Cancer Maternal Aunt         ? where    Heart Attack Paternal Uncle     Cancer Maternal Grandmother         ? where    Kidney Disease Maternal [address]         ? where - 35s    Heart Attack Paternal Uncle     Kidney Disease Cousin         kidney failure    Alcohol abuse Cousin     Dementia Mother         she is currently 79yo    No Known Problems Daughter          Objective:   ROS:  Per HPI or PMH o/w neg    Allergies   Allergen Reactions    Lisinopril Cough    Other Medication Rash     Vaccine for polio caused a rash. Meds:  Outpatient Medications Prior to Visit   Medication Sig Dispense Refill    carbidopa-levodopa (SINEMET)  mg per tablet Take 1 Tab by mouth three (3) times daily. 90 Tab 6    citalopram (CELEXA) 10 mg tablet Take 10 mg by mouth daily.  ondansetron hcl (ZOFRAN) 8 mg tablet Take 8 mg by mouth three (3) times daily as needed for Nausea.  tamsulosin HCl (TAMSULOSIN PO) Take 0.4 mg by mouth nightly.  tamsulosin ER      albuterol (VENTOLIN HFA) 90 mcg/actuation inhaler Take 2 Puffs by inhalation two (2) times daily as needed for Wheezing.  riboflavin, vitamin B2, (VITAMIN B-2) 100 mg tablet Take 100 mg by mouth daily.  atorvastatin (LIPITOR) 20 mg tablet Take 20 mg by mouth daily.  budesonide-formoterol (SYMBICORT) 80-4.5 mcg/actuation HFAA inhaler Take 2 Puffs by inhalation two (2) times a day. No facility-administered medications prior to visit. Imaging:  MRI Results (most recent):  Results from Hospital Encounter encounter on 06/17/15   MRI LUMB SPINE WO CONT    Narrative **Final Report**       ICD Codes / Adm. Diagnosis: 724.4  722.52 / Thoracic or lumbosacral neurit    Degeneration of lumbar or shine  Examination:  MR L SPINE WO CON  - [id number] - Jun 17 2015  1:49PM  Accession No:  [id number]  Reason:        REPORT:  Clinical indication: Back pain. Technical factors: Sagittal T1-weighted T2-weighted and STIR, axial T1 and   T2-weighted L1-S1. There is some mild bone marrow edema is seen in the chair endplate of L4   possibly degenerative in nature. There is no evidence for marrow   replacement. The alignment is satisfactory. The conus appears unremarkable. No paraspinal masses or fluid collections. L1-L2 shows no focal findings. L2-L3 shows minimal facet disease and ligamentum hypertrophy but no canal or   foraminal compromise. L3-L4 shows some minimal facet disease and ligamentum thickening no focal   findings. L4-5 show a left lateral disc protrusion with some encroachment on the   foramen and possible involvement of the exiting nerve roots. There is no   canal compromise. L5-S1 show no focal findings. IMPRESSION: Left lateral disc protrusion L4-5.               Signing/Reading Doctor: Mily Elizalde ([id number])    Approved: Mily Elizalde ([id number])  Jun 17 2015  2:37PM                                    CT Results (most recent):  Results from Hospital Encounter encounter on 08/21/19   CT ABD PELV W CONT    Narrative EXAM: CT ABD PELV W CONT    INDICATION: Prostate cancer [facility])    COMPARISON: The CT examination of the chest of 11/1/2018 is available for  correlation    CONTRAST: 100 mL of Isovue-370. TECHNIQUE:   Following the uneventful intravenous administration of contrast, thin axial  images were obtained through the abdomen and pelvis. Coronal and sagittal  reconstructions were generated. Oral contrast was not administered. CT dose  reduction was achieved through use of a standardized protocol tailored for this  examination and automatic exposure control for dose modulation. FINDINGS:   The images of the lung bases revealed no evidence of nodularity. The liver measures 17.2 cm longitudinally. No focal hepatic lesions are seen. A  focal, ovoid soft tissue density is noted in the dependent portion of the  gallbladder (see axial image 34 and coronal image 47). This finding measures 1.1  cm by 0.6 cm in size. This density is not calcified. This may possibly represent  the presence of sludge versus a noncalcified gallstone. No gallbladder wall  thickening is evident. There is no evidence of pericholecystic fluid or  inflammatory change. The spleen measures 9.5 cm longitudinally. No focal splenic  lesions are seen. The kidneys are normal in size. No renal calculi are evident. There is no evidence of hydronephrotic change. The urinary bladder was less than  optimally distended. There is enlargement of the prostate gland. There is  inhomogeneity of the prostate gland. No focal nodules are identified. There is  tortuosity of the normal caliber abdominal aorta present (see coronal image 52). A moderate amount of gas and fecal material is noted within the colon. There is  relative prominence of the fluid-filled duodenum. A moderately large amount of  fluid is noted within the stomach (see axial image 15). There is no evidence of  mural thickening involving the bowel. Tiny low-density areas are noted within  the kidneys.  Specifically, a 0.6 cm low-density area is noted in the upper pole  of the right kidney and a 0.3 cm low-density areas noted within the left kidney. These appear to represent cysts. There is evidence of colonic diverticulosis. There is subtle indistinctness of  the pericolonic fat (particularly in the region of the hepatic flexure/proximal  transverse colon). This may represent mild inflammatory change/developing  diverticulitis. Clinical correlation is indicated. There is no evidence of  intra-abdominal/intrapelvic or inguinal lymphadenopathy. Impression IMPRESSION:  1. Presence of a focal soft tissue density in the dependent portion of the  gallbladder as described above. No evidence of gallbladder wall thickening or  pericholecystic fluid/inflammatory change. 2. Presence of a moderate amount of gas and fecal material within the colon. Evidence of colonic diverticulosis. Subtle findings suggestive of developing,  mild inflammatory change in the region of the hepatic flexure/proximal  transverse colon. 3. Suboptimal distention of the urinary bladder. 4. Enlargement of the prostate gland. Normal sized kidneys. No evidence of  urolithiasis or hydronephrotic change. Presence of tiny low-density areas within  the kidneys suggestive of cysts. Reviewed records in [employer] and [facility] today    Lab Review   Results for orders placed or performed during the hospital encounter of 08/21/19   POC CREATININE   Result Value Ref Range    Creatinine (POC) 1.1 0.6 - 1.3 mg/dL    GFRAA, POC >60 >60 ml/min/1.73m2    GFRNA, POC >60 >60 ml/min/1.73m2        Exam:  Visit Vitals  /60   Pulse 69   Resp 16   Ht 6' (1.829 m)   Wt 62.1 kg (137 lb)   SpO2 98%   BMI 18.58 kg/m²     General:  Alert, cooperative, no distress. Head:  Normocephalic, without obvious abnormality, atraumatic. Respiratory:  Heart:   Non labored breathing  Regular rate and rhythm, no murmurs   Neck:      Extremities: Warm, no cyanosis or edema. Pulses: 2+ radial pulses. Neurologic:  MS: Alert and oriented x 4, speech - hypophonia. Language intact. Attention and fund of knowledge appropriate. Recent and remote memory intact. Cranial Nerves:  II: visual fields    II: pupils    II: optic disc    III,VII: ptosis none   III,IV,VI: extraocular muscles  EOMI, no nystagmus    V: facial light touch sensation     VII: facial muscle function   symmetric   VIII: hearing intact   IX: soft palate elevation     XI: trapezius strength     XI: sternocleidomastoid strength    XII: tongue       Motor: normal bulk, +Cogwheel rigidity L>R, no tremor, mild bradykinesia,  Strength: 5/5 throughout, no PD  Sensory:   Coordination: FTN and HTS intact, ОЛЬГА intact  Gait: Slight shuffling, does not lift right leg as well as left, dec arm swing in right arm, improved posture, extra step to turn. Reflexes:            Assessment/Plan   Pt is a 65 y.o. right handed male diagnosed with PD in 2008, reporting an action tremor, drags his right foot, hypophonia, no freezing, no wearing off, no swallowing difficulty, no NOH, no constipation. no hallucinations. Doing well on Carbidopa-levodopa 25-250mg tid. Exam with hypophonia slight shuffling does not lift right leg fully, decreased arm swing on the right, improved posture, extra step to turn. No resting tremor seen. Pt is not interested in increasing dose of Carbidopa levodopa at this time, continue 25-250mg, 1 tab tid. Follow-up in the clinic in 6 months with me or NP, instructed to call me interim with any questions or concerns. ICD-10-CM ICD-9-CM    1. Parkinson's disease ([facility] G20 332.0        Signed:   Graciela Adams MD  6/4/2020

## 2020-06-04 NOTE — PATIENT INSTRUCTIONS
PRESCRIPTION REFILL POLICY [facility] Statement to Patients April 1, 2014 In an effort to ensure the large volume of patient prescription refills is processed in the most efficient and expeditious manner, we are asking our patients to assist us by calling your Pharmacy for all prescription refills, this will include also your  Mail Order Pharmacy. The pharmacy will contact our office electronically to continue the refill process. Please do not wait until the last minute to call your pharmacy. We need at least 48 hours (2days) to fill prescriptions. We also encourage you to call your pharmacy before going to  your prescription to make sure it is ready. With regard to controlled substance prescription refill requests (narcotic refills) that need to be picked up at our office, we ask your cooperation by providing us with at least 72 hours (3days) notice that you will need a refill. We will not refill narcotic prescription refill requests after 4:00pm on any weekday, Monday through Thursday, or after 2:00pm on Fridays, or on the weekends. We encourage everyone to explore another way of getting your prescription refill request processed using [employer], our patient web portal through our electronic medical record system. [employer] is an efficient and effective way to communicate your medication request directly to the office and  downloadable as an juanita on your smart phone . [employer] also features a review functionality that allows you to view your medication list as well as leave messages for your physician. Are you ready to get connected? If so please review the attatched instructions or speak to any of our staff to get you set up right away! Thank you so much for your cooperation. Should you have any questions please contact our Practice Administrator. The Physicians and Staff,  [facility]

## 2020-06-29 RX ORDER — CARBIDOPA AND LEVODOPA 25; 250 MG/1; MG/1
TABLET ORAL
Qty: 270 TAB | Refills: 2 | Status: SHIPPED | OUTPATIENT
Start: 2020-06-29 | End: 2020-12-21 | Stop reason: SDUPTHER

## 2020-11-21 ENCOUNTER — HOSPITAL ENCOUNTER (EMERGENCY)
Age: 70
Discharge: HOME | End: 2020-11-21
Attending: EMERGENCY MEDICINE
Payer: MEDICARE

## 2020-11-21 ENCOUNTER — APPOINTMENT (OUTPATIENT)
Dept: GENERAL RADIOLOGY | Age: 70
End: 2020-11-21
Attending: NURSE PRACTITIONER
Payer: MEDICARE

## 2020-11-21 VITALS
TEMPERATURE: 98 F | BODY MASS INDEX: 18.56 KG/M2 | DIASTOLIC BLOOD PRESSURE: 88 MMHG | HEART RATE: 89 BPM | WEIGHT: 137 LBS | HEIGHT: 72 IN | SYSTOLIC BLOOD PRESSURE: 154 MMHG | RESPIRATION RATE: 16 BRPM | OXYGEN SATURATION: 96 %

## 2020-11-21 DIAGNOSIS — K59.00 CONSTIPATION, UNSPECIFIED CONSTIPATION TYPE: Primary | ICD-10-CM

## 2020-11-21 DIAGNOSIS — K64.4 EXTERNAL HEMORRHOID: ICD-10-CM

## 2020-11-21 LAB
ALBUMIN SERPL-MCNC: 3.2 G/DL (ref 3.5–5)
ALBUMIN/GLOB SERPL: 1 {RATIO} (ref 1.1–2.2)
ALP SERPL-CCNC: 92 U/L (ref 45–117)
ALT SERPL-CCNC: 9 U/L (ref 12–78)
ANION GAP SERPL CALC-SCNC: 9 MMOL/L (ref 5–15)
AST SERPL-CCNC: 12 U/L (ref 15–37)
BASOPHILS # BLD: 0 K/UL (ref 0–0.1)
BASOPHILS NFR BLD: 0 % (ref 0–1)
BILIRUB SERPL-MCNC: 0.6 MG/DL (ref 0.2–1)
BUN SERPL-MCNC: 15 MG/DL (ref 6–20)
BUN/CREAT SERPL: 13 (ref 12–20)
CALCIUM SERPL-MCNC: 8.6 MG/DL (ref 8.5–10.1)
CHLORIDE SERPL-SCNC: 108 MMOL/L (ref 97–108)
CO2 SERPL-SCNC: 27 MMOL/L (ref 21–32)
CREAT SERPL-MCNC: 1.16 MG/DL (ref 0.7–1.3)
DIFFERENTIAL METHOD BLD: ABNORMAL
EOSINOPHIL # BLD: 0 K/UL (ref 0–0.4)
EOSINOPHIL NFR BLD: 0 % (ref 0–7)
ERYTHROCYTE [DISTWIDTH] IN BLOOD BY AUTOMATED COUNT: 13.9 % (ref 11.5–14.5)
GLOBULIN SER CALC-MCNC: 3.2 G/DL (ref 2–4)
GLUCOSE SERPL-MCNC: 92 MG/DL (ref 65–100)
HCT VFR BLD AUTO: 38.6 % (ref 36.6–50.3)
HEMOCCULT STL QL: NEGATIVE
HGB BLD-MCNC: 12.8 G/DL (ref 12.1–17)
IMM GRANULOCYTES # BLD AUTO: 0 K/UL (ref 0–0.04)
IMM GRANULOCYTES NFR BLD AUTO: 0 % (ref 0–0.5)
LYMPHOCYTES # BLD: 0.8 K/UL (ref 0.8–3.5)
LYMPHOCYTES NFR BLD: 8 % (ref 12–49)
MCH RBC QN AUTO: 31.6 PG (ref 26–34)
MCHC RBC AUTO-ENTMCNC: 33.2 G/DL (ref 30–36.5)
MCV RBC AUTO: 95.3 FL (ref 80–99)
MONOCYTES # BLD: 0.9 K/UL (ref 0–1)
MONOCYTES NFR BLD: 9 % (ref 5–13)
NEUTS SEG # BLD: 7.9 K/UL (ref 1.8–8)
NEUTS SEG NFR BLD: 83 % (ref 32–75)
NRBC # BLD: 0 K/UL (ref 0–0.01)
NRBC BLD-RTO: 0 PER 100 WBC
PLATELET # BLD AUTO: 166 K/UL (ref 150–400)
PMV BLD AUTO: 10.6 FL (ref 8.9–12.9)
POTASSIUM SERPL-SCNC: 3.9 MMOL/L (ref 3.5–5.1)
PROT SERPL-MCNC: 6.4 G/DL (ref 6.4–8.2)
RBC # BLD AUTO: 4.05 M/UL (ref 4.1–5.7)
SODIUM SERPL-SCNC: 144 MMOL/L (ref 136–145)
WBC # BLD AUTO: 9.7 K/UL (ref 4.1–11.1)

## 2020-11-21 PROCEDURE — 99283 EMERGENCY DEPT VISIT LOW MDM: CPT

## 2020-11-21 PROCEDURE — 80053 COMPREHEN METABOLIC PANEL: CPT

## 2020-11-21 PROCEDURE — 36415 COLL VENOUS BLD VENIPUNCTURE: CPT

## 2020-11-21 PROCEDURE — 82272 OCCULT BLD FECES 1-3 TESTS: CPT

## 2020-11-21 PROCEDURE — 85025 COMPLETE CBC W/AUTO DIFF WBC: CPT

## 2020-11-21 PROCEDURE — 74018 RADEX ABDOMEN 1 VIEW: CPT

## 2020-11-21 RX ORDER — DOCUSATE SODIUM 100 MG/1
100 CAPSULE, LIQUID FILLED ORAL
Qty: 60 CAP | Refills: 0 | Status: SHIPPED | OUTPATIENT
Start: 2020-11-21 | End: 2021-02-19

## 2020-11-21 RX ORDER — MAGNESIUM CITRATE
296 SOLUTION, ORAL ORAL
Qty: 1 BOTTLE | Refills: 0 | Status: SHIPPED | OUTPATIENT
Start: 2020-11-21 | End: 2020-11-21

## 2020-11-21 NOTE — DISCHARGE INSTRUCTIONS
Patient Education        Constipation: Care Instructions  Your Care Instructions     Constipation means that you have a hard time passing stools (bowel movements). People pass stools from 3 times a day to once every 3 days. What is normal for you may be different. Constipation may occur with pain in the rectum and cramping. The pain may get worse when you try to pass stools. Sometimes there are small amounts of bright red blood on toilet paper or the surface of stools. This is because of enlarged veins near the rectum (hemorrhoids). A few changes in your diet and lifestyle may help you avoid ongoing constipation. Your doctor may also prescribe medicine to help loosen your stool. Some medicines can cause constipation. These include pain medicines and antidepressants. Tell your doctor about all the medicines you take. Your doctor may want to make a medicine change to ease your symptoms. Follow-up care is a key part of your treatment and safety. Be sure to make and go to all appointments, and call your doctor if you are having problems. It's also a good idea to know your test results and keep a list of the medicines you take. How can you care for yourself at home? · Drink plenty of fluids, enough so that your urine is light yellow or clear like water. If you have kidney, heart, or liver disease and have to limit fluids, talk with your doctor before you increase the amount of fluids you drink. · Include high-fiber foods in your diet each day. These include fruits, vegetables, beans, and whole grains. · Get at least 30 minutes of exercise on most days of the week. Walking is a good choice. You also may want to do other activities, such as running, swimming, cycling, or playing tennis or team sports. · Take a fiber supplement, such as Citrucel or Metamucil, every day. Read and follow all instructions on the label. · Schedule time each day for a bowel movement. A daily routine may help.  Take your time having your bowel movement. · Support your feet with a small step stool when you sit on the toilet. This helps flex your hips and places your pelvis in a squatting position. · Your doctor may recommend an over-the-counter laxative to relieve your constipation. Examples are Milk of Magnesia and MiraLax. Read and follow all instructions on the label. Do not use laxatives on a long-term basis. When should you call for help? Call your doctor now or seek immediate medical care if:    · You have new or worse belly pain.     · You have new or worse nausea or vomiting.     · You have blood in your stools. Watch closely for changes in your health, and be sure to contact your doctor if:    · Your constipation is getting worse.     · You do not get better as expected. Where can you learn more? Go to http://www.aguayo.com/  Enter P343 in the search box to learn more about \"Constipation: Care Instructions. \"  Current as of: June 26, 2019               Content Version: 12.6  © 9521-1540 [employer]. Care instructions adapted under license by [employer] (which disclaims liability or warranty for this information). If you have questions about a medical condition or this instruction, always ask your healthcare professional. [place] [address] any warranty or liability for your use of this information. Patient Education        Hemorrhoids: Care Instructions  Your Care Instructions     Hemorrhoids are enlarged veins that develop in the anal canal. Bleeding during bowel movements, itching, swelling, and rectal pain are the most common symptoms. They can be uncomfortable at times, but hemorrhoids rarely are a serious problem. You can treat most hemorrhoids with simple changes to your diet and bowel habits. These changes include eating more fiber and not straining to pass stools.  Most hemorrhoids do not need surgery or other treatment unless they are very large and painful or bleed a lot. Follow-up care is a key part of your treatment and safety. Be sure to make and go to all appointments, and call your doctor if you are having problems. It's also a good idea to know your test results and keep a list of the medicines you take. How can you care for yourself at home? · Sit in a few inches of warm water (sitz bath) 3 times a day and after bowel movements. The warm water helps with pain and itching. · Put ice on your anal area several times a day for 10 minutes at a time. Put a thin cloth between the ice and your skin. Follow this by placing a warm, wet towel on the area for another 10 to 20 minutes. · Take pain medicines exactly as directed. ? If the doctor gave you a prescription medicine for pain, take it as prescribed. ? If you are not taking a prescription pain medicine, ask your doctor if you can take an over-the-counter medicine. · Keep the anal area clean, but be gentle. Use water and a fragrance-free soap, such as [place], or use baby wipes or medicated pads, such as Tucks. · Wear cotton underwear and loose clothing to decrease moisture in the anal area. · Eat more fiber. Include foods such as whole-grain breads and cereals, raw vegetables, raw and dried fruits, and beans. · Drink plenty of fluids, enough so that your urine is light yellow or clear like water. If you have kidney, heart, or liver disease and have to limit fluids, talk with your doctor before you increase the amount of fluids you drink. · Use a stool softener that contains bran or psyllium. You can save money by buying bran or psyllium (available in bulk at most health food stores) and sprinkling it on foods or stirring it into fruit juice. Or you can use a product such as Metamucil or Hydrocil. · Practice healthy bowel habits. ? Go to the bathroom as soon as you have the urge. ? Avoid straining to pass stools. Relax and give yourself time to let things happen naturally.   ? Do not hold your breath while passing stools. ? Do not read while sitting on the toilet. Get off the toilet as soon as you have finished. · Take your medicines exactly as prescribed. Call your doctor if you think you are having a problem with your medicine. When should you call for help? Call 911 anytime you think you may need emergency care. For example, call if:    · You pass maroon or very bloody stools. Call your doctor now or seek immediate medical care if:    · You have increased pain.     · You have increased bleeding. Watch closely for changes in your health, and be sure to contact your doctor if:    · Your symptoms have not improved after 3 or 4 days. Where can you learn more? Go to http://www.HelpAround.com/  Enter F228 in the search box to learn more about \"Hemorrhoids: Care Instructions. \"  Current as of: April 15, 2020               Content Version: 12.6  © 5871-5272 [employer]. Care instructions adapted under license by [employer] (which disclaims liability or warranty for this information). If you have questions about a medical condition or this instruction, always ask your healthcare professional. [place] [address] any warranty or liability for your use of this information.

## 2020-11-21 NOTE — ED NOTES
Pt presents ambulatory to ED complaining of \"bumps on butt\" and constipation x2-3 weeks. Pt denies hx of hemorrhoids, denies taking medication to alleviate constipation. Pt is alert and oriented x 4, RR even and unlabored, skin is warm and dry. Assesment completed and pt updated on plan of care. Emergency Department Nursing Plan of Care       The Nursing Plan of Care is developed from the Nursing assessment and Emergency Department Attending provider initial evaluation. The plan of care may be reviewed in the ED Provider note.     The Plan of Care was developed with the following considerations:   Patient / Family readiness to learn indicated by:verbalized understanding  Persons(s) to be included in education: patient  Barriers to Learning/Limitations:No    Signed     Lior Rogers RN    11/21/2020   1:27 PM

## 2020-11-22 NOTE — ED PROVIDER NOTES
EMERGENCY DEPARTMENT HISTORY AND PHYSICAL EXAM    Date: 11/21/2020  Patient Name: Margarita Chavira    History of Presenting Illness     Chief Complaint   Patient presents with    Constipation    Anal Pain         History Provided By: Patient    Chief Complaint: anal pain  Duration: onset 6 days ago  Timing:  Acute  Location: rectal area  Quality: Aching  Severity: 10 out of 10  Modifying Factors: having bowel movement worsens pain  Associated Symptoms: constipation      HPI: Margarita Chavira is a 65 y.o. male with a PMH of Stroke spinal stenosis COPD parkinsons who presents with no pain for the past 6 days. Patient states he feels there is a lump at his rectal area. Patient states he is having hard stools. Patient states he had a small amount of hard stool this morning. Patient states he has had constipation problems for the past 3 weeks. Patient denies change in diet. He denies change in medication. Denies taking pain medications. He denies blood in his stool. PCP: Gordo Castaneda MD    Current Outpatient Medications   Medication Sig Dispense Refill    hydrocortisone-pramoxine (PROCTOFOAM HC) rectal foam Insert 1 Applicator into rectum two (2) times a day. 1 Can 0    docusate sodium (Colace) 100 mg capsule Take 1 Cap by mouth two (2) times daily as needed for Constipation for up to 90 days. 60 Cap 0    magnesium citrate solution Take 296 mL by mouth now for 1 dose. 1 Bottle 0    carbidopa-levodopa (SINEMET)  mg per tablet TAKE 1 TABLET BY MOUTH THREE TIMES A  Tab 2    citalopram (CELEXA) 10 mg tablet Take 10 mg by mouth daily.  ondansetron hcl (ZOFRAN) 8 mg tablet Take 8 mg by mouth three (3) times daily as needed for Nausea.  tamsulosin HCl (TAMSULOSIN PO) Take 0.4 mg by mouth nightly. tamsulosin ER      albuterol (VENTOLIN HFA) 90 mcg/actuation inhaler Take 2 Puffs by inhalation two (2) times daily as needed for Wheezing.       riboflavin, vitamin B2, (VITAMIN B-2) 100 mg tablet Take 100 mg by mouth daily.  atorvastatin (LIPITOR) 20 mg tablet Take 20 mg by mouth daily.  budesonide-formoterol (SYMBICORT) 80-4.5 mcg/actuation HFAA inhaler Take 2 Puffs by inhalation two (2) times a day. Past History     Past Medical History:  Past Medical History:   Diagnosis Date    Aortic atherosclerosis ([address]     Arthritis     Chronic obstructive pulmonary disease (HCC)     emphysema    Depression     History of left shoulder fracture     Hyperlipidemia     Hypertension     Ill-defined condition     hx dislocated left shoulder    Ill-defined condition     prostate ca/ seed implant    Lumbar spinal stenosis     PT/injection/accupunture/exercise [facility] 2 tmes a week    Parkinson's disease ([address]     Shingles     Stroke ([address] 2002    no residual problems       Past Surgical History:  Past Surgical History:   Procedure Laterality Date    COLONOSCOPY N/A 2/19/2020    [facility] ENDOSCOPY    HX CATARACT REMOVAL Bilateral     HX ORTHOPAEDIC      steroid injections in back d/t spinal stenosis    HX TONSILLECTOMY         Family History:  Family History   Problem Relation Age of Onset    Other Father         kidney removed - unsure of reason, was estranged    Cancer Maternal Aunt         ? where    Heart Attack Paternal Uncle     Cancer Maternal Grandmother         ? where    Kidney Disease Maternal Aunt     Cancer Cousin         ? where - 35s    Heart Attack Paternal Uncle     Kidney Disease Cousin         kidney failure    Alcohol abuse Cousin     Dementia Mother         she is currently 79yo    No Known Problems Daughter        Social History:  Social History     Tobacco Use    Smoking status: Former Smoker    Smokeless tobacco: Never Used    Tobacco comment: quit 31 yrs ago   Substance Use Topics    Alcohol use:  Yes     Alcohol/week: 7.0 standard drinks     Types: 7 Glasses of wine per week     Comment: wine with dinner/mixed drink    Drug use: No       Allergies: Allergies   Allergen Reactions    Lisinopril Cough    Other Medication Rash     Vaccine for polio caused a rash. Review of Systems   Review of Systems   Constitutional: Negative for chills, fatigue and fever. HENT: Negative for congestion and sore throat. Eyes: Negative for redness. Respiratory: Negative for cough, chest tightness and wheezing. Cardiovascular: Negative for chest pain. Gastrointestinal: Positive for constipation. Negative for abdominal pain and blood in stool. Genitourinary: Negative for dysuria. Rectal pain   Musculoskeletal: Negative for arthralgias, back pain, myalgias, neck pain and neck stiffness. Skin: Negative for rash. All other systems reviewed and are negative. Physical Exam     Vitals:    11/21/20 1204 11/21/20 1548   BP: (!) 152/114 (!) 154/88   Pulse: 100 89   Resp: 16 16   Temp: 97.8 °F (36.6 °C) 98 °F (36.7 °C)   SpO2: 94% 96%   Weight: 62.1 kg (137 lb)    Height: 6' (1.829 m)      Physical Exam  Vitals signs and nursing note reviewed. Constitutional:       Appearance: He is well-developed. HENT:      Head: Normocephalic and atraumatic. Right Ear: External ear normal.   Eyes:      General:         Right eye: No discharge. Left eye: No discharge. Conjunctiva/sclera: Conjunctivae normal.   Neck:      Musculoskeletal: Normal range of motion and neck supple. Cardiovascular:      Rate and Rhythm: Normal rate and regular rhythm. Heart sounds: Normal heart sounds. Pulmonary:      Effort: Pulmonary effort is normal. No respiratory distress. Breath sounds: Normal breath sounds. No wheezing. Abdominal:      General: Bowel sounds are normal.      Palpations: Abdomen is soft. Tenderness: There is no abdominal tenderness. Genitourinary:     Rectum: Guaiac result negative. Comments: 4mm external hemorrhoid  Musculoskeletal: Normal range of motion. Lymphadenopathy:      Cervical: No cervical adenopathy. Skin:     General: Skin is warm and dry. Neurological:      Mental Status: He is alert and oriented to person, place, and time. Cranial Nerves: No cranial nerve deficit. Psychiatric:         Behavior: Behavior normal.         Thought Content: Thought content normal.         Judgment: Judgment normal.           Diagnostic Study Results     Labs -     Recent Results (from the past 12 hour(s))   CBC WITH AUTOMATED DIFF    Collection Time: 11/21/20  1:15 PM   Result Value Ref Range    WBC 9.7 4.1 - 11.1 K/uL    RBC 4.05 (L) 4.10 - 5.70 M/uL    HGB 12.8 12.1 - 17.0 g/dL    HCT 38.6 36.6 - 50.3 %    MCV 95.3 80.0 - 99.0 FL    MCH 31.6 26.0 - 34.0 PG    MCHC 33.2 30.0 - 36.5 g/dL    RDW 13.9 11.5 - 14.5 %    PLATELET 956 387 - 908 K/uL    MPV 10.6 8.9 - 12.9 FL    NRBC 0.0 0  WBC    ABSOLUTE NRBC 0.00 0.00 - 0.01 K/uL    NEUTROPHILS 83 (H) 32 - 75 %    LYMPHOCYTES 8 (L) 12 - 49 %    MONOCYTES 9 5 - 13 %    EOSINOPHILS 0 0 - 7 %    BASOPHILS 0 0 - 1 %    IMMATURE GRANULOCYTES 0 0.0 - 0.5 %    ABS. NEUTROPHILS 7.9 1.8 - 8.0 K/UL    ABS. LYMPHOCYTES 0.8 0.8 - 3.5 K/UL    ABS. MONOCYTES 0.9 0.0 - 1.0 K/UL    ABS. EOSINOPHILS 0.0 0.0 - 0.4 K/UL    ABS. BASOPHILS 0.0 0.0 - 0.1 K/UL    ABS. IMM. GRANS. 0.0 0.00 - 0.04 K/UL    DF AUTOMATED     METABOLIC PANEL, COMPREHENSIVE    Collection Time: 11/21/20  1:15 PM   Result Value Ref Range    Sodium 144 136 - 145 mmol/L    Potassium 3.9 3.5 - 5.1 mmol/L    Chloride 108 97 - 108 mmol/L    CO2 27 21 - 32 mmol/L    Anion gap 9 5 - 15 mmol/L    Glucose 92 65 - 100 mg/dL    BUN 15 6 - 20 MG/DL    Creatinine 1.16 0.70 - 1.30 MG/DL    BUN/Creatinine ratio 13 12 - 20      GFR est AA >60 >60 ml/min/1.73m2    GFR est non-AA >60 >60 ml/min/1.73m2    Calcium 8.6 8.5 - 10.1 MG/DL    Bilirubin, total 0.6 0.2 - 1.0 MG/DL    ALT (SGPT) 9 (L) 12 - 78 U/L    AST (SGOT) 12 (L) 15 - 37 U/L    Alk.  phosphatase 92 45 - 117 U/L Protein, total 6.4 6.4 - 8.2 g/dL    Albumin 3.2 (L) 3.5 - 5.0 g/dL    Globulin 3.2 2.0 - 4.0 g/dL    A-G Ratio 1.0 (L) 1.1 - 2.2     OCCULT BLOOD, STOOL    Collection Time: 11/21/20  1:15 PM   Result Value Ref Range    Occult blood, stool Negative NEG         Radiologic Studies -   XR ABD (KUB)   Final Result   IMPRESSION: Normal colonic gas pattern; fluid-filled small bowel. CT Results  (Last 48 hours)    None        CXR Results  (Last 48 hours)    None            Medical Decision Making   I am the first provider for this patient. I reviewed the vital signs, available nursing notes, past medical history, past surgical history, family history and social history. Vital Signs-Reviewed the patient's vital signs. Records Reviewed: Nursing Notes            Disposition:  Home    DISCHARGE NOTE:           Care plan outlined and precautions discussed. Patient has no new complaints, changes, or physical findings. Results of tests were reviewed with the patient. All medications were reviewed with the patient; will d/c home with colace mag citrate proctofoam . All of pt's questions and concerns were addressed. Patient was instructed and agrees to follow up with PCP, as well as to return to the ED upon further deterioration. Patient is ready to go home. Follow-up Information     Follow up With Specialties Details Why Contact Info    Yumiko Hernandez MD Family Medicine In 3 days  [place] [id number] [phone]            Discharge Medication List as of 11/21/2020  3:15 PM      START taking these medications    Details   hydrocortisone-pramoxine (PROCTOFOAM HC) rectal foam Insert 1 Applicator into rectum two (2) times a day., Normal, Disp-1 Can,R-0      docusate sodium (Colace) 100 mg capsule Take 1 Cap by mouth two (2) times daily as needed for Constipation for up to 90 days. , Normal, Disp-60 Cap,R-0      magnesium citrate solution Take 296 mL by mouth now for 1 dose., Normal, Disp-1 Bottle,R-0         CONTINUE these medications which have NOT CHANGED    Details   carbidopa-levodopa (SINEMET)  mg per tablet TAKE 1 TABLET BY MOUTH THREE TIMES A DAY, Normal, Disp-270 Tab,R-2      citalopram (CELEXA) 10 mg tablet Take 10 mg by mouth daily. , Historical Med      ondansetron hcl (ZOFRAN) 8 mg tablet Take 8 mg by mouth three (3) times daily as needed for Nausea., Historical Med      tamsulosin HCl (TAMSULOSIN PO) Take 0.4 mg by mouth nightly. tamsulosin ER, Historical Med      albuterol (VENTOLIN HFA) 90 mcg/actuation inhaler Take 2 Puffs by inhalation two (2) times daily as needed for Wheezing., Historical Med      riboflavin, vitamin B2, (VITAMIN B-2) 100 mg tablet Take 100 mg by mouth daily. , Historical Med      atorvastatin (LIPITOR) 20 mg tablet Take 20 mg by mouth daily. , Historical Med      budesonide-formoterol (SYMBICORT) 80-4.5 mcg/actuation HFAA inhaler Take 2 Puffs by inhalation two (2) times a day., Historical Med             Provider Notes (Medical Decision Making):   DDX patient hemorrhoid rectal fissure skin tag  Procedures:  Procedures    Please note that this dictation was completed with Dragon, computer voice recognition software. Quite often unanticipated grammatical, syntax, homophones, and other interpretive errors are inadvertently transcribed by the computer software. Please disregard these errors. Additionally, please excuse any errors that have escaped final proofreading. Diagnosis     Clinical Impression:   1. Constipation, unspecified constipation type    2.  External hemorrhoid

## 2020-12-21 ENCOUNTER — OFFICE VISIT (OUTPATIENT)
Dept: NEUROLOGY | Age: 70
End: 2020-12-21
Payer: MEDICARE

## 2020-12-21 VITALS
DIASTOLIC BLOOD PRESSURE: 60 MMHG | SYSTOLIC BLOOD PRESSURE: 108 MMHG | OXYGEN SATURATION: 98 % | RESPIRATION RATE: 18 BRPM

## 2020-12-21 DIAGNOSIS — G20 PARKINSON'S DISEASE (HCC): Primary | ICD-10-CM

## 2020-12-21 PROCEDURE — 3017F COLORECTAL CA SCREEN DOC REV: CPT | Performed by: PSYCHIATRY & NEUROLOGY

## 2020-12-21 PROCEDURE — G8536 NO DOC ELDER MAL SCRN: HCPCS | Performed by: PSYCHIATRY & NEUROLOGY

## 2020-12-21 PROCEDURE — 99214 OFFICE O/P EST MOD 30 MIN: CPT | Performed by: PSYCHIATRY & NEUROLOGY

## 2020-12-21 PROCEDURE — 1101F PT FALLS ASSESS-DOCD LE1/YR: CPT | Performed by: PSYCHIATRY & NEUROLOGY

## 2020-12-21 PROCEDURE — G8427 DOCREV CUR MEDS BY ELIG CLIN: HCPCS | Performed by: PSYCHIATRY & NEUROLOGY

## 2020-12-21 PROCEDURE — G8432 DEP SCR NOT DOC, RNG: HCPCS | Performed by: PSYCHIATRY & NEUROLOGY

## 2020-12-21 PROCEDURE — G8420 CALC BMI NORM PARAMETERS: HCPCS | Performed by: PSYCHIATRY & NEUROLOGY

## 2020-12-21 RX ORDER — CARBIDOPA AND LEVODOPA 25; 250 MG/1; MG/1
1 TABLET ORAL 4 TIMES DAILY
Qty: 360 TAB | Refills: 1 | Status: SHIPPED | OUTPATIENT
Start: 2020-12-21 | End: 2021-06-29 | Stop reason: SDUPTHER

## 2020-12-21 RX ORDER — MELATONIN
DAILY
COMMUNITY
End: 2022-04-15

## 2020-12-21 NOTE — LETTER
12/31/2020 Patient: Kristy Leung YOB: 1950 Date of Visit: 12/21/2020 Regina Giang MD 
[address] [phone] [address] Fax: [phone] Dear Regina Giang MD, Thank you for referring Mr. Danette Gomez to [facility] for evaluation. My notes for this consultation are attached. If you have questions, please do not hesitate to call me. I look forward to following your patient along with you. Sincerely, Tu Benton MD

## 2020-12-21 NOTE — PROGRESS NOTES
Neurology Consult Note      HISTORY PROVIDED BY: patient    Chief Complaint:   Chief Complaint   Patient presents with    Tremors      Subjective:   Pt is a 65 y.o. right handed male last seen in clinic on 6/4/20 in f/u for PD, diagnosed in 2008, reporting an action tremor, drags his right foot, hypophonia, no freezing, no wearing off, no swallowing difficulty, no NOH, no constipation. no hallucinations. Doing well on Carbidopa-levodopa 25-250mg tid. Exam with hypophonia slight shuffling does not lift right leg fully, decreased arm swing on the right, improved posture, extra step to turn. No resting tremor seen. Pt was not interested in increasing dose of Carbidopa levodopa, continued 25-250mg, 1 tab tid. He returns for f/u. He has been to the ED in Nov for constipation and hemorrhoids, treated with colace with improvement, he has run out. He has noticed more trouble with tremor in right hand when trying to eat with spoon. Has noticed more trouble with short term memory loss, gives example of not remembering a name, but eventually comes to him.       Symptoms:   Tremors/Shaking Yes  Muscle stiffness (rigidity): none  Problems with balance, shuffling walk, falls: no falls, +shuffling  Slowness (bradykinesia): none  Freezing or wearing off, off time:none  Soft voice, issues swallowing: +soft voice, no swallowing issues  Sudden, erratic movements (Dyskinesias): none   Memory loss, difficulty thinking or remembering clearly: yes - likely normal for age  Depression/anxiety: No  Difficulty sleeping, issues with talking in sleep or acting out dreams: no  Constipation: Yes  Hallucinations/delusions: No  Nausea: None   Lightheadedness, positional dizziness: Occasionally, not consistently  Compulsive behaviors/urges None   Morning akinesia : No     Past Medical History:   Diagnosis Date    Aortic atherosclerosis ([facility] [address]     Arthritis     Chronic obstructive pulmonary disease ([facility] [address]     emphysema    Depression     History of left shoulder fracture     Hyperlipidemia     Hypertension     Ill-defined condition     hx dislocated left shoulder    Ill-defined condition     prostate ca/ seed implant    Lumbar spinal stenosis     PT/injection/accupunture/exercise [facility] 2 tmes a week    Parkinson's disease ([facility] [address]     Shingles     Stroke ([facility] [address] 2002    no residual problems      Past Surgical History:   Procedure Laterality Date    COLONOSCOPY N/A 2/19/2020    COLONOS[facility] performed by [facility]umeet [facility]bar [facility] ENDOSCOPY    HX CATARACT REMOVAL Bilateral     HX ORTHOPAEDIC      steroid injections in back d/t spinal stenosis    HX TONSILLECTOMY        Social History     Socioeconomic History    Marital status:      Spouse name: Not on file    Number of children: Not on file    Years of education: Not on file    Highest education level: Not on file   Occupational History    Not on file   Social Needs    Financial resource strain: Not on file    Food insecurity     Worry: Not on file     Inability: Not on file    Transportation needs     Medical: Not on file     Non-medical: Not on file   Tobacco Use    Smoking status: Former Smoker    Smokeless tobacco: Never Used    Tobacco comment: quit 31 yrs ago   Substance and Sexual Activity    Alcohol use:  Yes     Alcohol/week: 7.0 standard drinks     Types: 7 Glasses of wine per week     Comment: wine with dinner/mixed drink    Drug use: No    Sexual activity: Not Currently   Lifestyle    Physical activity     Days per week: Not on file     Minutes per session: Not on file    Stress: Not on file   Relationships    Social connections     Talks on phone: Not on file     Gets together: Not on file     Attends Jainism service: Not on file     Active member of club or organization: Not on file     Attends meetings of clubs or organizations: Not on file     Relationship status: Not on file    Intimate partner violence     Fear of current or ex partner: Not on file Emotionally abused: Not on file     Physically abused: Not on file     Forced sexual activity: Not on file   Other Topics Concern    Not on file   Social History Narrative    Lives in [place] alone, does not drive     Family History   Problem Relation Age of Onset    Other Father         kidney removed - unsure of reason, was estranged    Cancer Maternal Aunt         ? where    Heart Attack Paternal Uncle     Cancer Maternal Grandmother         ? where    Kidney Disease Maternal Aunt     Cancer Cousin         ? where - 35s    Heart Attack Paternal Uncle     Kidney Disease Cousin         kidney failure    Alcohol abuse Cousin     Dementia Mother         she is currently 79yo    No Known Problems Daughter          Objective:   ROS:  Per HPI or PMH o/w neg    Allergies   Allergen Reactions    Lisinopril Cough    Other Medication Rash     Vaccine for polio caused a rash. Meds:  Outpatient Medications Prior to Visit   Medication Sig Dispense Refill    cholecalciferol (Vitamin D3) (1000 Units /25 mcg) tablet Take  by mouth daily.  docusate sodium (Colace) 100 mg capsule Take 1 Cap by mouth two (2) times daily as needed for Constipation for up to 90 days. 60 Cap 0    carbidopa-levodopa (SINEMET)  mg per tablet TAKE 1 TABLET BY MOUTH THREE TIMES A  Tab 2    citalopram (CELEXA) 10 mg tablet Take 10 mg by mouth daily.  ondansetron hcl (ZOFRAN) 8 mg tablet Take 8 mg by mouth three (3) times daily as needed for Nausea.  tamsulosin HCl (TAMSULOSIN PO) Take 0.4 mg by mouth nightly. tamsulosin ER      albuterol (VENTOLIN HFA) 90 mcg/actuation inhaler Take 2 Puffs by inhalation two (2) times daily as needed for Wheezing.  riboflavin, vitamin B2, (VITAMIN B-2) 100 mg tablet Take 100 mg by mouth daily.  atorvastatin (LIPITOR) 20 mg tablet Take 20 mg by mouth daily.       budesonide-formoterol (SYMBICORT) 80-4.5 mcg/actuation HFAA inhaler Take 2 Puffs by inhalation two (2) times a day.  hydrocortisone-pramoxine (PROCTOFOAM HC) rectal foam Insert 1 Applicator into rectum two (2) times a day. 1 Can 0     No facility-administered medications prior to visit. Imaging:  MRI Results (most recent):  Results from Hospital Encounter encounter on 06/17/15   MRI LUMB SPINE WO CONT    Narrative **Final Report**       ICD Codes / Adm. Diagnosis: 724.4  722.52 / Thoracic or lumbosacral neurit    Degeneration of lumbar or shine  Examination:  MR L SPINE WO CON  - [id number] - Jun 17 2015  1:49PM  Accession No:  [id number]  Reason:        REPORT:  Clinical indication: Back pain. Technical factors: Sagittal T1-weighted T2-weighted and STIR, axial T1 and   T2-weighted L1-S1. There is some mild bone marrow edema is seen in the chair endplate of L4   possibly degenerative in nature. There is no evidence for marrow   replacement. The alignment is satisfactory. The conus appears unremarkable. No paraspinal masses or fluid collections. L1-L2 shows no focal findings. L2-L3 shows minimal facet disease and ligamentum hypertrophy but no canal or   foraminal compromise. L3-L4 shows some minimal facet disease and ligamentum thickening no focal   findings. L4-5 show a left lateral disc protrusion with some encroachment on the   foramen and possible involvement of the exiting nerve roots. There is no   canal compromise. L5-S1 show no focal findings. IMPRESSION: Left lateral disc protrusion L4-5. Signing/Reading Doctor: Omar Mas ([id number])    Approved: Omar Mas ([id number])  Jun 17 2015  2:37PM                                    CT Results (most recent):  Results from Hospital Encounter encounter on 08/21/19   CT ABD PELV W CONT    Narrative EXAM: CT ABD PELV W CONT    INDICATION: Prostate cancer ([facility] [address]    COMPARISON: The CT examination of the chest of 11/1/2018 is available for  correlation    CONTRAST: 100 mL of Isovue-370.     TECHNIQUE:   Following the uneventful intravenous administration of contrast, thin axial  images were obtained through the abdomen and pelvis. Coronal and sagittal  reconstructions were generated. Oral contrast was not administered. CT dose  reduction was achieved through use of a standardized protocol tailored for this  examination and automatic exposure control for dose modulation. FINDINGS:   The images of the lung bases revealed no evidence of nodularity. The liver measures 17.2 cm longitudinally. No focal hepatic lesions are seen. A  focal, ovoid soft tissue density is noted in the dependent portion of the  gallbladder (see axial image 34 and coronal image 47). This finding measures 1.1  cm by 0.6 cm in size. This density is not calcified. This may possibly represent  the presence of sludge versus a noncalcified gallstone. No gallbladder wall  thickening is evident. There is no evidence of pericholecystic fluid or  inflammatory change. The spleen measures 9.5 cm longitudinally. No focal splenic  lesions are seen. The kidneys are normal in size. No renal calculi are evident. There is no evidence of hydronephrotic change. The urinary bladder was less than  optimally distended. There is enlargement of the prostate gland. There is  inhomogeneity of the prostate gland. No focal nodules are identified. There is  tortuosity of the normal caliber abdominal aorta present (see coronal image 52). A moderate amount of gas and fecal material is noted within the colon. There is  relative prominence of the fluid-filled duodenum. A moderately large amount of  fluid is noted within the stomach (see axial image 15). There is no evidence of  mural thickening involving the bowel. Tiny low-density areas are noted within  the kidneys. Specifically, a 0.6 cm low-density area is noted in the upper pole  of the right kidney and a 0.3 cm low-density areas noted within the left kidney. These appear to represent cysts. There is evidence of colonic diverticulosis. There is subtle indistinctness of  the pericolonic fat (particularly in the region of the hepatic flexure/proximal  transverse colon). This may represent mild inflammatory change/developing  diverticulitis. Clinical correlation is indicated. There is no evidence of  intra-abdominal/intrapelvic or inguinal lymphadenopathy. Impression IMPRESSION:  1. Presence of a focal soft tissue density in the dependent portion of the  gallbladder as described above. No evidence of gallbladder wall thickening or  pericholecystic fluid/inflammatory change. 2. Presence of a moderate amount of gas and fecal material within the colon. Evidence of colonic diverticulosis. Subtle findings suggestive of developing,  mild inflammatory change in the region of the hepatic flexure/proximal  transverse colon. 3. Suboptimal distention of the urinary bladder. 4. Enlargement of the prostate gland. Normal sized kidneys. No evidence of  urolithiasis or hydronephrotic change. Presence of tiny low-density areas within  the kidneys suggestive of cysts. Reviewed records in [employer] [facility] tab today    Lab Review   Results for orders placed or performed during the hospital encounter of 11/21/20   CBC WITH AUTOMATED DIFF   Result Value Ref Range    WBC 9.7 4.1 - 11.1 K/uL    RBC 4.05 (L) 4.10 - 5.70 M/uL    HGB 12.8 12.1 - 17.0 g/dL    HCT 38.6 36.6 - 50.3 %    MCV 95.3 80.0 - 99.0 FL    MCH 31.6 26.0 - 34.0 PG    MCHC 33.2 30.0 - 36.5 g/dL    RDW 13.9 11.5 - 14.5 %    PLATELET 565 852 - 120 K/uL    MPV 10.6 8.9 - 12.9 FL    NRBC 0.0 0  WBC    ABSOLUTE NRBC 0.00 0.00 - 0.01 K/uL    NEUTROPHILS 83 (H) 32 - 75 %    LYMPHOCYTES 8 (L) 12 - 49 %    MONOCYTES 9 5 - 13 %    EOSINOPHILS 0 0 - 7 %    BASOPHILS 0 0 - 1 %    IMMATURE GRANULOCYTES 0 0.0 - 0.5 %    ABS. NEUTROPHILS 7.9 1.8 - 8.0 K/UL    ABS. LYMPHOCYTES 0.8 0.8 - 3.5 K/UL    ABS. MONOCYTES 0.9 0.0 - 1.0 K/UL    ABS. EOSINOPHILS 0.0 0.0 - 0.4 K/UL    ABS.  BASOPHILS 0.0 0.0 - 0.1 K/UL    ABS. IMM. GRANS. 0.0 0.00 - 0.04 K/UL    DF AUTOMATED     METABOLIC PANEL, COMPREHENSIVE   Result Value Ref Range    Sodium 144 136 - 145 mmol/L    Potassium 3.9 3.5 - 5.1 mmol/L    Chloride 108 97 - 108 mmol/L    CO2 27 21 - 32 mmol/L    Anion gap 9 5 - 15 mmol/L    Glucose 92 65 - 100 mg/dL    BUN 15 6 - 20 MG/DL    Creatinine 1.16 0.70 - 1.30 MG/DL    BUN/Creatinine ratio 13 12 - 20      GFR est AA >60 >60 ml/min/1.73m2    GFR est non-AA >60 >60 ml/min/1.73m2    Calcium 8.6 8.5 - 10.1 MG/DL    Bilirubin, total 0.6 0.2 - 1.0 MG/DL    ALT (SGPT) 9 (L) 12 - 78 U/L    AST (SGOT) 12 (L) 15 - 37 U/L    Alk. phosphatase 92 45 - 117 U/L    Protein, total 6.4 6.4 - 8.2 g/dL    Albumin 3.2 (L) 3.5 - 5.0 g/dL    Globulin 3.2 2.0 - 4.0 g/dL    A-G Ratio 1.0 (L) 1.1 - 2.2     OCCULT BLOOD, STOOL   Result Value Ref Range    Occult blood, stool Negative NEG          Exam:  Visit Vitals  /60   Resp 18   SpO2 98%     General:  Alert, cooperative, no distress. Head:  Normocephalic, without obvious abnormality, atraumatic. Respiratory:  Heart:   Non labored breathing  Regular rate and rhythm, no murmurs   Neck:      Extremities: Warm, no cyanosis or edema. Pulses: 2+ radial pulses. Neurologic:  MS: Alert and oriented x 4, speech - hypophonia. Language intact. Attention and fund of knowledge appropriate. Recent and remote memory intact.   Cranial Nerves:  II: visual fields    II: pupils    II: optic disc    III,VII: ptosis none   III,IV,VI: extraocular muscles  EOMI, no nystagmus    V: facial light touch sensation     VII: facial muscle function   symmetric   VIII: hearing intact   IX: soft palate elevation     XI: trapezius strength     XI: sternocleidomastoid strength    XII: tongue       Motor: normal bulk, +Cogwheel rigidity L>R, no tremor, mild bradykinesia,  Strength: 5/5 throughout, no PD  Sensory:   Coordination: FTN and HTS intact, ОЛЬГА intact  Gait: Slight shuffling, does not lift right leg as well as left, dec arm swing in right arm, improved posture, extra step to turn. Reflexes:            Assessment/Plan   Pt is a 65 y.o. right handed male with PD, diagnosed in 2008, reporting an action tremor, drags his right foot, hypophonia, no freezing, no wearing off, no swallowing difficulty, no NOH, no hallucinations. Increase in tremor recently, +constipation. Exam with hypophonia slight shuffling does not lift right leg fully, decreased arm swing on the right, improved posture, extra step to turn. No resting tremor seen. Increase Carbidopa-levodopa 25-250mg, to 1 tab qid. Discussed constipation and prevention. Start trial of metamucil. Follow-up in the clinic in 3 months with me or NP, instructed to call me interim with any questions or concerns. ICD-10-CM ICD-9-CM    1. Parkinson's disease ([facility]  G20 332.0        Signed:   Racquel Soto MD  12/21/2020

## 2021-02-27 ENCOUNTER — APPOINTMENT (OUTPATIENT)
Dept: GENERAL RADIOLOGY | Age: 71
End: 2021-02-27
Attending: EMERGENCY MEDICINE
Payer: MEDICARE

## 2021-02-27 ENCOUNTER — HOSPITAL ENCOUNTER (EMERGENCY)
Age: 71
Discharge: HOME | End: 2021-02-27
Attending: EMERGENCY MEDICINE
Payer: MEDICARE

## 2021-02-27 VITALS
RESPIRATION RATE: 16 BRPM | SYSTOLIC BLOOD PRESSURE: 102 MMHG | HEIGHT: 72 IN | OXYGEN SATURATION: 99 % | HEART RATE: 62 BPM | TEMPERATURE: 97.1 F | WEIGHT: 131 LBS | BODY MASS INDEX: 17.74 KG/M2 | DIASTOLIC BLOOD PRESSURE: 59 MMHG

## 2021-02-27 DIAGNOSIS — M54.32 LEFT SIDED SCIATICA: Primary | ICD-10-CM

## 2021-02-27 PROCEDURE — 74011000250 HC RX REV CODE- 250: Performed by: EMERGENCY MEDICINE

## 2021-02-27 PROCEDURE — 74011250637 HC RX REV CODE- 250/637: Performed by: EMERGENCY MEDICINE

## 2021-02-27 PROCEDURE — 74011250636 HC RX REV CODE- 250/636: Performed by: EMERGENCY MEDICINE

## 2021-02-27 PROCEDURE — 96372 THER/PROPH/DIAG INJ SC/IM: CPT

## 2021-02-27 PROCEDURE — 72100 X-RAY EXAM L-S SPINE 2/3 VWS: CPT

## 2021-02-27 PROCEDURE — 99283 EMERGENCY DEPT VISIT LOW MDM: CPT

## 2021-02-27 RX ORDER — CYCLOBENZAPRINE HCL 10 MG
10 TABLET ORAL
Qty: 7 TAB | Refills: 0 | Status: SHIPPED | OUTPATIENT
Start: 2021-02-27

## 2021-02-27 RX ORDER — KETOROLAC TROMETHAMINE 30 MG/ML
30 INJECTION, SOLUTION INTRAMUSCULAR; INTRAVENOUS
Status: COMPLETED | OUTPATIENT
Start: 2021-02-27 | End: 2021-02-27

## 2021-02-27 RX ORDER — CYCLOBENZAPRINE HCL 10 MG
10 TABLET ORAL
Status: COMPLETED | OUTPATIENT
Start: 2021-02-27 | End: 2021-02-27

## 2021-02-27 RX ORDER — NAPROXEN 500 MG/1
500 TABLET ORAL
Qty: 20 TAB | Refills: 0 | Status: SHIPPED | OUTPATIENT
Start: 2021-02-27

## 2021-02-27 RX ORDER — LIDOCAINE 4 G/100G
1 PATCH TOPICAL EVERY 24 HOURS
Status: DISCONTINUED | OUTPATIENT
Start: 2021-02-27 | End: 2021-02-27 | Stop reason: HOSPADM

## 2021-02-27 RX ADMIN — KETOROLAC TROMETHAMINE 30 MG: 30 INJECTION, SOLUTION INTRAMUSCULAR; INTRAVENOUS at 12:23

## 2021-02-27 RX ADMIN — CYCLOBENZAPRINE 10 MG: 10 TABLET, FILM COATED ORAL at 12:23

## 2021-02-27 NOTE — ED NOTES
Pt presents to ED ambulatory with cane assist complaining of left lower back pain x 1 week that worsened this morning. Pt reports he saw his PCP for same complaint and was given a cream. Pt reports he has spinal stenosis and is prescribed pain patches for this, pt reports he has been using these patches without relief. Pt denies urinary problems. Pt denies fall or injury. Pt reports slight right-sided deficit from stroke in 2002. Pt is alert and oriented x 4, RR even and unlabored, skin is warm and dry. Assessment completed and pt updated on plan of care. Call bell in reach. Emergency Department Nursing Plan of Care       The Nursing Plan of Care is developed from the Nursing assessment and Emergency Department Attending provider initial evaluation. The plan of care may be reviewed in the ED Provider note.     The Plan of Care was developed with the following considerations:   Patient / Family readiness to learn indicated by:verbalized understanding  Persons(s) to be included in education: patient  Barriers to Learning/Limitations:No    Signed     Nadia Ko    2/27/2021   12:02 PM

## 2021-02-27 NOTE — ED NOTES
Discharge instructions were given to the patient by Nahomi Mckay RN. The patient left the Emergency Department ambulatory, alert and oriented and in no acute distress with 2 prescriptions. The patient was encouraged to call or return to the ED for worsening issues or problems and was encouraged to schedule a follow up appointment for continuing care. The patient verbalized understanding of discharge instructions and prescriptions, all questions were answered. The patient has no further concerns at this time.

## 2021-02-27 NOTE — ED PROVIDER NOTES
EMERGENCY DEPARTMENT HISTORY AND PHYSICAL EXAM      Date: 2/27/2021  Patient Name: Tabatha Wilson    History of Presenting Illness     Chief Complaint   Patient presents with    Back Pain     lower back pain times one week    Flank Pain     times three days       History Provided By: Patient    HPI: Tabatha Wilson, 65 y.o. male with PMHx significant for COPD, hypertension, hyperlipidemia, Parkinson's disease, spinal stenosis, who presents with a chief complaint of left-sided low back pain ongoing for last week. Was seen by his PCP earlier this week and prescribed medication, however is not sure what. Pain is worsened since last night. Pain is worse with movement or ambulation. Located primarily in the left side of his back rating down the left leg. Denies any saddle anesthesia, bowel or bladder incontinence, history of back surgery. PCP: Alysia Fuentes MD    There are no other complaints, changes, or physical findings at this time. Current Outpatient Medications   Medication Sig Dispense Refill    cyclobenzaprine (FLEXERIL) 10 mg tablet Take 1 Tab by mouth nightly. 7 Tab 0    naproxen (NAPROSYN) 500 mg tablet Take 1 Tab by mouth every twelve (12) hours as needed for Pain. 20 Tab 0    cholecalciferol (Vitamin D3) (1000 Units /25 mcg) tablet Take  by mouth daily.  carbidopa-levodopa (SINEMET)  mg per tablet Take 1 Tab by mouth four (4) times daily. 360 Tab 1    citalopram (CELEXA) 10 mg tablet Take 10 mg by mouth daily.  ondansetron hcl (ZOFRAN) 8 mg tablet Take 8 mg by mouth three (3) times daily as needed for Nausea.  tamsulosin HCl (TAMSULOSIN PO) Take 0.4 mg by mouth nightly. tamsulosin ER      albuterol (VENTOLIN HFA) 90 mcg/actuation inhaler Take 2 Puffs by inhalation two (2) times daily as needed for Wheezing.  riboflavin, vitamin B2, (VITAMIN B-2) 100 mg tablet Take 100 mg by mouth daily.  atorvastatin (LIPITOR) 20 mg tablet Take 20 mg by mouth daily.       budesonide-formoterol (SYMBICORT) 80-4.5 mcg/actuation HFAA inhaler Take 2 Puffs by inhalation two (2) times a day. Past History     Past Medical History:  Past Medical History:   Diagnosis Date    Aortic atherosclerosis ([address]     Arthritis     Chronic obstructive pulmonary disease (HCC)     emphysema    Depression     History of left shoulder fracture     Hyperlipidemia     Hypertension     Ill-defined condition     hx dislocated left shoulder    Ill-defined condition     prostate ca/ seed implant    Lumbar spinal stenosis     PT/injection/accupunture/exercise [facility] 2 tmes a week    Parkinson's disease ([address]     Shingles     Stroke [facility] [address] 2002    no residual problems     Past Surgical History:  Past Surgical History:   Procedure Laterality Date    COLONOSCOPY N/A 2/19/2020    COLONOSCOPY performed by Barby Merchant [facility] at [facility] ENDOSCOPY    HX CATARACT REMOVAL Bilateral     HX ORTHOPAEDIC      steroid injections in back d/t spinal stenosis    HX TONSILLECTOMY       Family History:  Family History   Problem Relation Age of Onset    Other Father         kidney removed - unsure of reason, was estranged    Cancer Maternal Aunt         ? where    Heart Attack Paternal Uncle     Cancer Maternal Grandmother         ? where    Kidney Disease Maternal Aunt     Cancer Cousin         ? where - 35s    Heart Attack Paternal Uncle     Kidney Disease Cousin         kidney failure    Alcohol abuse Cousin     Dementia Mother         she is currently 81yo    No Known Problems Daughter      Social History:  Social History     Tobacco Use    Smoking status: Former Smoker    Smokeless tobacco: Never Used    Tobacco comment: quit 31 yrs ago   Substance Use Topics    Alcohol use: Yes     Alcohol/week: 7.0 standard drinks     Types: 7 Glasses of wine per week     Comment: wine with dinner/mixed drink    Drug use: No     Allergies:   Allergies   Allergen Reactions    Lisinopril Cough    Other Medication Rash     Vaccine for polio caused a rash. Review of Systems   Review of Systems   Constitutional: Negative for chills and fever. HENT: Negative for congestion, rhinorrhea and sore throat. Respiratory: Negative for cough and shortness of breath. Cardiovascular: Negative for chest pain. Gastrointestinal: Negative for abdominal pain, nausea and vomiting. Genitourinary: Negative for dysuria and urgency. Musculoskeletal: Positive for back pain and gait problem. Skin: Negative for rash. Neurological: Negative for dizziness, weakness, light-headedness, numbness and headaches. All other systems reviewed and are negative. Physical Exam   Physical Exam  Vitals signs and nursing note reviewed. Constitutional:       General: He is not in acute distress. Appearance: He is well-developed. HENT:      Head: Normocephalic and atraumatic. Eyes:      Conjunctiva/sclera: Conjunctivae normal.      Pupils: Pupils are equal, round, and reactive to light. Neck:      Musculoskeletal: Normal range of motion. Cardiovascular:      Rate and Rhythm: Normal rate and regular rhythm. Pulmonary:      Effort: Pulmonary effort is normal. No respiratory distress. Breath sounds: Normal breath sounds. No stridor. Abdominal:      General: There is no distension. Palpations: Abdomen is soft. Tenderness: There is no abdominal tenderness. Musculoskeletal: Normal range of motion. Comments: ttp over the left SI joint, +SLR, distally NVI   Skin:     General: Skin is warm and dry. Neurological:      Mental Status: He is alert and oriented to person, place, and time. Diagnostic Study Results   Labs -   No results found for this or any previous visit (from the past 12 hour(s)). Radiologic Studies -   XR SPINE LUMB 2 OR 3 V   Final Result   Transitional anatomy. Multilevel degenerative changes with interval   progression. Facet hypertrophy.                     Xr Spine Lumb 2 Or 3 V    Result Date: 2/27/2021  Transitional anatomy. Multilevel degenerative changes with interval progression. Facet hypertrophy. Medical Decision Making   I am the first provider for this patient. I reviewed the vital signs, available nursing notes, past medical history, past surgical history, family history and social history. Vital Signs-Reviewed the patient's vital signs. Patient Vitals for the past 12 hrs:   Temp Pulse Resp BP SpO2   02/27/21 1345 -- -- -- (!) 102/59 --   02/27/21 1338 -- 62 16 (!) 104/51 99 %   02/27/21 1122 97.1 °F (36.2 °C) 65 16 (!) 97/57 99 %       Pulse Oximetry Analysis - 99% on ra      Records Reviewed: Nursing Notes and Old Medical Records    Provider Notes (Medical Decision Making):   Patient presents with a chief complaint of low back pain. No red flag symptoms. Exam consistent with sciatica. Will treat symptoms and reevaluate. ED Course:   Initial assessment performed. The patients presenting problems have been discussed, and they are in agreement with the care plan formulated and outlined with them. I have encouraged them to ask questions as they arise throughout their visit. ED Course as of Feb 27 1929   Sat Feb 27, 2021   1337 Patient feeling improved after medications. Able to ambulate with a steady gait. Will d/c with medication and instructions to follow up with his outpatient providers    [PANTERA]      ED Course User Index  Mildred Dang MD       Procedures:  Procedures    Critical Care:  none    Disposition:  Discharge Note:  The patient has been re-evaluated and is ready for discharge. Reviewed available results with patient. Counseled patient on diagnosis and care plan. Patient has expressed understanding, and all questions have been answered. Patient agrees with plan and agrees to follow up as recommended, or to return to the ED if their symptoms worsen.  Discharge instructions have been provided and explained to the patient, along with reasons to return to the ED. PLAN:  1. Discharge Medication List as of 2/27/2021  1:45 PM      START taking these medications    Details   cyclobenzaprine (FLEXERIL) 10 mg tablet Take 1 Tab by mouth nightly., Normal, Disp-7 Tab, R-0      naproxen (NAPROSYN) 500 mg tablet Take 1 Tab by mouth every twelve (12) hours as needed for Pain., Normal, Disp-20 Tab, R-0         CONTINUE these medications which have NOT CHANGED    Details   cholecalciferol (Vitamin D3) (1000 Units /25 mcg) tablet Take  by mouth daily. , Historical Med      carbidopa-levodopa (SINEMET)  mg per tablet Take 1 Tab by mouth four (4) times daily. , Normal, Disp-360 Tab, R-1      citalopram (CELEXA) 10 mg tablet Take 10 mg by mouth daily. , Historical Med      ondansetron hcl (ZOFRAN) 8 mg tablet Take 8 mg by mouth three (3) times daily as needed for Nausea., Historical Med      tamsulosin HCl (TAMSULOSIN PO) Take 0.4 mg by mouth nightly. tamsulosin ER, Historical Med      albuterol (VENTOLIN HFA) 90 mcg/actuation inhaler Take 2 Puffs by inhalation two (2) times daily as needed for Wheezing., Historical Med      riboflavin, vitamin B2, (VITAMIN B-2) 100 mg tablet Take 100 mg by mouth daily. , Historical Med      atorvastatin (LIPITOR) 20 mg tablet Take 20 mg by mouth daily. , Historical Med      budesonide-formoterol (SYMBICORT) 80-4.5 mcg/actuation HFAA inhaler Take 2 Puffs by inhalation two (2) times a day., Historical Med           2. Follow-up Information     Follow up With Specialties Details Why Contact Info    Eliel Calixto MD [facility] Medicine Schedule an appointment as soon as possible for a visit   [address]  [phone]      your spine doctor  Schedule an appointment as soon as possible for a visit       [address] [place] [address] EMERGENCY DEPT Emergency Medicine  As needed, If symptoms worsen [place]  [phone]        Return to ED if worse     Diagnosis     Clinical Impression:   1.  Left sided sciatica            Please note that this dictation was completed with [email or web address], the computer voice recognition software. Quite often unanticipated grammatical, syntax, homophones, and other interpretive errors are inadvertently transcribed by the computer software. Please disregard these errors.   Please excuse any errors that have escaped final proofreading

## 2021-03-22 ENCOUNTER — OFFICE VISIT (OUTPATIENT)
Dept: NEUROLOGY | Age: 71
End: 2021-03-22
Payer: MEDICARE

## 2021-03-22 VITALS
HEART RATE: 82 BPM | DIASTOLIC BLOOD PRESSURE: 70 MMHG | WEIGHT: 137.6 LBS | OXYGEN SATURATION: 98 % | HEIGHT: 72 IN | SYSTOLIC BLOOD PRESSURE: 110 MMHG | BODY MASS INDEX: 18.64 KG/M2

## 2021-03-22 DIAGNOSIS — G20 PARKINSON'S DISEASE (HCC): Primary | ICD-10-CM

## 2021-03-22 PROCEDURE — 1101F PT FALLS ASSESS-DOCD LE1/YR: CPT | Performed by: PSYCHIATRY & NEUROLOGY

## 2021-03-22 PROCEDURE — 3017F COLORECTAL CA SCREEN DOC REV: CPT | Performed by: PSYCHIATRY & NEUROLOGY

## 2021-03-22 PROCEDURE — G8536 NO DOC ELDER MAL SCRN: HCPCS | Performed by: PSYCHIATRY & NEUROLOGY

## 2021-03-22 PROCEDURE — 99214 OFFICE O/P EST MOD 30 MIN: CPT | Performed by: PSYCHIATRY & NEUROLOGY

## 2021-03-22 PROCEDURE — G8427 DOCREV CUR MEDS BY ELIG CLIN: HCPCS | Performed by: PSYCHIATRY & NEUROLOGY

## 2021-03-22 PROCEDURE — G8432 DEP SCR NOT DOC, RNG: HCPCS | Performed by: PSYCHIATRY & NEUROLOGY

## 2021-03-22 PROCEDURE — G8420 CALC BMI NORM PARAMETERS: HCPCS | Performed by: PSYCHIATRY & NEUROLOGY

## 2021-03-22 RX ORDER — FLUTICASONE PROPIONATE AND SALMETEROL 500; 50 UG/1; UG/1
1 POWDER RESPIRATORY (INHALATION) DAILY
COMMUNITY
Start: 2021-01-18

## 2021-03-22 RX ORDER — DIAZEPAM 5 MG/1
5 TABLET ORAL 3 TIMES DAILY
COMMUNITY
Start: 2021-03-11

## 2021-03-22 RX ORDER — ENTACAPONE 200 MG/1
200 TABLET ORAL 4 TIMES DAILY
Qty: 120 TAB | Refills: 3 | Status: SHIPPED | OUTPATIENT
Start: 2021-03-22 | End: 2021-06-18

## 2021-03-22 RX ORDER — MIRTAZAPINE 30 MG/1
30 TABLET, FILM COATED ORAL
COMMUNITY
Start: 2021-02-23

## 2021-03-22 RX ORDER — PANTOPRAZOLE SODIUM 40 MG/1
40 TABLET, DELAYED RELEASE ORAL DAILY
COMMUNITY
Start: 2021-02-23

## 2021-03-22 NOTE — PROGRESS NOTES
Neurology Consult Note      HISTORY PROVIDED BY: patient    Chief Complaint:   Chief Complaint   Patient presents with    Follow-up    Parkinsons Disease      Subjective:   Pt is a 65 y.o. right handed male last seen in clinic on 12/21/20 in f/u for PD, diagnosed in 2008, reporting an action tremor, drags his right foot, hypophonia, no freezing, no wearing off, no swallowing difficulty, no NOH, no hallucinations. Increase in tremor recently, +constipation. Exam with hypophonia slight shuffling does not lift right leg fully, decreased arm swing on the right, improved posture, extra step to turn. No resting tremor seen. Increased Carbidopa-levodopa 25-250mg, to 1 tab qid. Discussed constipation and prevention. Started trial of metamucil. He returns for f/u. Since last visit, he has had severe LBP with sciatica and is in accupuncture tx for this. He is on valium \"for tremor,\" recently increased from 1 tab a day to tid by his PCP and he does not know why. He has noticed worsening tremor with activity. He is taking carbidopa-levodopa 25-250mg qid and denies wearing off. No dyskinesias. He has lost 40lbs in last year, he not certain why. States he is not that hungry. He may eat 3 good meals one day and not have an appetite the next. Drinks protein drinks. No hallucinations, no RBD, no NOH. Has had constipation, not severe. He is on mirtazapine 30mg qhs from PCP as well. He has had first COVID-19 vaccine.        Symptoms:   Tremors/Shaking Yes  Muscle stiffness (rigidity): none  Problems with balance, shuffling walk, falls: no falls, +shuffling  Slowness (bradykinesia): yes  Freezing or wearing off, off time:none  Soft voice, issues swallowing: +soft voice, no swallowing issues  Sudden, erratic movements (Dyskinesias): none   Memory loss, difficulty thinking or remembering clearly: yes - likely normal for age  Depression/anxiety: No  Difficulty sleeping, issues with talking in sleep or acting out dreams: no  Constipation: Yes  Hallucinations/delusions: No  Nausea: None   Lightheadedness, positional dizziness: No  Compulsive behaviors/urges None   Morning akinesia : No     Past Medical History:   Diagnosis Date    Aortic atherosclerosis (HCC)     Arthritis     Chronic obstructive pulmonary disease (HCC)     emphysema    Depression     History of left shoulder fracture     Hyperlipidemia     Hypertension     Ill-defined condition     hx dislocated left shoulder    Ill-defined condition     prostate ca/ seed implant    Lumbar spinal stenosis     PT/injection/accupunture/exercise [facility] 2 tmes a week    [facility]'s [facility] ([facility]     Shingles     Stroke ([facility] 2002    no residual problems      Past Surgical History:   Procedure Laterality Date    COLONOSCOPY N/A 2/19/2020    COL[facility] by Belkys Pressley [facility] at [facility] ENDOSCOPY    HX CATARACT REMOVAL Bilateral     HX ORTHOPAEDIC      steroid injections in back d/t spinal stenosis    HX TONSILLECTOMY        Social History     Socioeconomic History    Marital status:      Spouse name: Not on file    Number of children: Not on file    Years of education: Not on file    Highest education level: Not on file   Occupational History    Not on file   Social Needs    Financial resource strain: Not on file    Food insecurity     Worry: Not on file     Inability: Not on file    Transportation needs     Medical: Not on file     Non-medical: Not on file   Tobacco Use    Smoking status: Former Smoker    Smokeless tobacco: Never Used    Tobacco comment: quit 31 yrs ago   Substance and Sexual Activity    Alcohol use:  Yes     Alcohol/week: 7.0 standard drinks     Types: 7 Glasses of wine per week     Comment: wine with dinner/mixed drink    Drug use: No    Sexual activity: Not Currently   Lifestyle    Physical activity     Days per week: Not on file     Minutes per session: Not on file    Stress: Not on file   Relationships    Social connections     Talks on phone: Not on file     Gets together: Not on file     Attends Yazidi service: Not on file     Active member of club or organization: Not on file     Attends meetings of clubs or organizations: Not on file     Relationship status: Not on file    Intimate partner violence     Fear of current or ex partner: Not on file     Emotionally abused: Not on file     Physically abused: Not on file     Forced sexual activity: Not on file   Other Topics Concern    Not on file   Social History Narrative    Lives in [place] alone, does not drive     Family History   Problem Relation Age of Onset    Other Father         kidney removed - unsure of reason, was estranged    Cancer Maternal Aunt         ? where    Heart Attack Paternal Uncle     Cancer Maternal Grandmother         ? where    Kidney Disease Maternal [address]         ? where - 35s    Heart Attack Paternal Uncle     Kidney Disease Cousin         kidney failure    Alcohol abuse Cousin     Dementia Mother         she is currently 79yo    No Known Problems Daughter          Objective:   ROS:  Per HPI or PMH o/w neg    Allergies   Allergen Reactions    Lisinopril Cough    Other Medication Rash     Vaccine for polio caused a rash. Meds:    Current Outpatient Medications:     diazePAM (VALIUM) 5 mg tablet, Take 5 mg by mouth three (3) times daily. , Disp: , Rfl:     fluticasone propion-salmeteroL (ADVAIR/WIXELA) 500-50 mcg/dose diskus inhaler, Take 1 Puff by inhalation daily. , Disp: , Rfl:     mirtazapine (REMERON) 30 mg tablet, Take 30 mg by mouth nightly., Disp: , Rfl:     pantoprazole (PROTONIX) 40 mg tablet, Take 40 mg by mouth daily. , Disp: , Rfl:     cyclobenzaprine (FLEXERIL) 10 mg tablet, Take 1 Tab by mouth nightly., Disp: 7 Tab, Rfl: 0    naproxen (NAPROSYN) 500 mg tablet, Take 1 Tab by mouth every twelve (12) hours as needed for Pain., Disp: 20 Tab, Rfl: 0    cholecalciferol (Vitamin D3) (1000 Units /25 mcg) tablet, Take  by mouth daily. , Disp: , Rfl:     carbidopa-levodopa (SINEMET)  mg per tablet, Take 1 Tab by mouth four (4) times daily. , Disp: 360 Tab, Rfl: 1    citalopram (CELEXA) 10 mg tablet, Take 10 mg by mouth daily. , Disp: , Rfl:     ondansetron hcl (ZOFRAN) 8 mg tablet, Take 8 mg by mouth three (3) times daily as needed for Nausea., Disp: , Rfl:     tamsulosin HCl (TAMSULOSIN PO), Take 0.4 mg by mouth nightly. tamsulosin ER, Disp: , Rfl:     albuterol (VENTOLIN HFA) 90 mcg/actuation inhaler, Take 2 Puffs by inhalation two (2) times daily as needed for Wheezing., Disp: , Rfl:     riboflavin, vitamin B2, (VITAMIN B-2) 100 mg tablet, Take 100 mg by mouth daily. , Disp: , Rfl:     atorvastatin (LIPITOR) 20 mg tablet, Take 20 mg by mouth daily. , Disp: , Rfl:     budesonide-formoterol (SYMBICORT) 80-4.5 mcg/actuation HFAA inhaler, Take 2 Puffs by inhalation two (2) times a day., Disp: , Rfl:     Imaging:  MRI Results (most recent):  Results from Hospital Encounter encounter on 06/17/15   MRI LUMB SPINE WO CONT    Narrative **Final Report**       ICD Codes / Adm. Diagnosis: 724.4  722.52 / Thoracic or lumbosacral neurit    Degeneration of lumbar or shine  Examination:  MR L SPINE WO CON  - [id number] - Jun 17 2015  1:49PM  Accession No:  [id number]  Reason:        REPORT:  Clinical indication: Back pain. Technical factors: Sagittal T1-weighted T2-weighted and STIR, axial T1 and   T2-weighted L1-S1. There is some mild bone marrow edema is seen in the chair endplate of L4   possibly degenerative in nature. There is no evidence for marrow   replacement. The alignment is satisfactory. The conus appears unremarkable. No paraspinal masses or fluid collections. L1-L2 shows no focal findings. L2-L3 shows minimal facet disease and ligamentum hypertrophy but no canal or   foraminal compromise. L3-L4 shows some minimal facet disease and ligamentum thickening no focal   findings.   L4-5 show a left lateral disc protrusion with some encroachment on the   foramen and possible involvement of the exiting nerve roots. There is no   canal compromise. L5-S1 show no focal findings. IMPRESSION: Left lateral disc protrusion L4-5. Signing/Reading Doctor: Fernie Wagner ([id number])    Approved: Fernie Wagner ([id number])  Jun 17 2015  2:37PM                                    CT Results (most recent):  Results from Hospital Encounter encounter on 08/21/19   CT ABD PELV W CONT    Narrative EXAM: CT ABD PELV W CONT    INDICATION: Prostate cancer ([address]    COMPARISON: The CT examination of the chest of 11/1/2018 is available for  correlation    CONTRAST: 100 mL of Isovue-370. TECHNIQUE:   Following the uneventful intravenous administration of contrast, thin axial  images were obtained through the abdomen and pelvis. Coronal and sagittal  reconstructions were generated. Oral contrast was not administered. CT dose  reduction was achieved through use of a standardized protocol tailored for this  examination and automatic exposure control for dose modulation. FINDINGS:   The images of the lung bases revealed no evidence of nodularity. The liver measures 17.2 cm longitudinally. No focal hepatic lesions are seen. A  focal, ovoid soft tissue density is noted in the dependent portion of the  gallbladder (see axial image 34 and coronal image 47). This finding measures 1.1  cm by 0.6 cm in size. This density is not calcified. This may possibly represent  the presence of sludge versus a noncalcified gallstone. No gallbladder wall  thickening is evident. There is no evidence of pericholecystic fluid or  inflammatory change. The spleen measures 9.5 cm longitudinally. No focal splenic  lesions are seen. The kidneys are normal in size. No renal calculi are evident. There is no evidence of hydronephrotic change. The urinary bladder was less than  optimally distended. There is enlargement of the prostate gland. There is  inhomogeneity of the prostate gland. No focal nodules are identified. There is  tortuosity of the normal caliber abdominal aorta present (see coronal image 52). A moderate amount of gas and fecal material is noted within the colon. There is  relative prominence of the fluid-filled duodenum. A moderately large amount of  fluid is noted within the stomach (see axial image 15). There is no evidence of  mural thickening involving the bowel. Tiny low-density areas are noted within  the kidneys. Specifically, a 0.6 cm low-density area is noted in the upper pole  of the right kidney and a 0.3 cm low-density areas noted within the left kidney. These appear to represent cysts. There is evidence of colonic diverticulosis. There is subtle indistinctness of  the pericolonic fat (particularly in the region of the hepatic flexure/proximal  transverse colon). This may represent mild inflammatory change/developing  diverticulitis. Clinical correlation is indicated. There is no evidence of  intra-abdominal/intrapelvic or inguinal lymphadenopathy. Impression IMPRESSION:  1. Presence of a focal soft tissue density in the dependent portion of the  gallbladder as described above. No evidence of gallbladder wall thickening or  pericholecystic fluid/inflammatory change. 2. Presence of a moderate amount of gas and fecal material within the colon. Evidence of colonic diverticulosis. Subtle findings suggestive of developing,  mild inflammatory change in the region of the hepatic flexure/proximal  transverse colon. 3. Suboptimal distention of the urinary bladder. 4. Enlargement of the prostate gland. Normal sized kidneys. No evidence of  urolithiasis or hydronephrotic change. Presence of tiny low-density areas within  the kidneys suggestive of cysts.         Reviewed records in [place] and [facility] tab today    Lab Review   Results for orders placed or performed during the hospital encounter of 11/21/20   CBC WITH AUTOMATED DIFF   Result Value Ref Range    WBC 9.7 4.1 - 11.1 K/uL    RBC 4.05 (L) 4.10 - 5.70 M/uL    HGB 12.8 12.1 - 17.0 g/dL    HCT 38.6 36.6 - 50.3 %    MCV 95.3 80.0 - 99.0 FL    MCH 31.6 26.0 - 34.0 PG    MCHC 33.2 30.0 - 36.5 g/dL    RDW 13.9 11.5 - 14.5 %    PLATELET 625 981 - 541 K/uL    MPV 10.6 8.9 - 12.9 FL    NRBC 0.0 0  WBC    ABSOLUTE NRBC 0.00 0.00 - 0.01 K/uL    NEUTROPHILS 83 (H) 32 - 75 %    LYMPHOCYTES 8 (L) 12 - 49 %    MONOCYTES 9 5 - 13 %    EOSINOPHILS 0 0 - 7 %    BASOPHILS 0 0 - 1 %    IMMATURE GRANULOCYTES 0 0.0 - 0.5 %    ABS. NEUTROPHILS 7.9 1.8 - 8.0 K/UL    ABS. LYMPHOCYTES 0.8 0.8 - 3.5 K/UL    ABS. MONOCYTES 0.9 0.0 - 1.0 K/UL    ABS. EOSINOPHILS 0.0 0.0 - 0.4 K/UL    ABS. BASOPHILS 0.0 0.0 - 0.1 K/UL    ABS. IMM. GRANS. 0.0 0.00 - 0.04 K/UL    DF AUTOMATED     METABOLIC PANEL, COMPREHENSIVE   Result Value Ref Range    Sodium 144 136 - 145 mmol/L    Potassium 3.9 3.5 - 5.1 mmol/L    Chloride 108 97 - 108 mmol/L    CO2 27 21 - 32 mmol/L    Anion gap 9 5 - 15 mmol/L    Glucose 92 65 - 100 mg/dL    BUN 15 6 - 20 MG/DL    Creatinine 1.16 0.70 - 1.30 MG/DL    BUN/Creatinine ratio 13 12 - 20      GFR est AA >60 >60 ml/min/1.73m2    GFR est non-AA >60 >60 ml/min/1.73m2    Calcium 8.6 8.5 - 10.1 MG/DL    Bilirubin, total 0.6 0.2 - 1.0 MG/DL    ALT (SGPT) 9 (L) 12 - 78 U/L    AST (SGOT) 12 (L) 15 - 37 U/L    Alk. phosphatase 92 45 - 117 U/L    Protein, total 6.4 6.4 - 8.2 g/dL    Albumin 3.2 (L) 3.5 - 5.0 g/dL    Globulin 3.2 2.0 - 4.0 g/dL    A-G Ratio 1.0 (L) 1.1 - 2.2     OCCULT BLOOD, STOOL   Result Value Ref Range    Occult blood, stool Negative NEG          Exam:  Visit Vitals  /70   Pulse 82   Ht 6' (1.829 m)   Wt 137 lb 9.6 oz (62.4 kg)   SpO2 98%   BMI 18.66 kg/m²     General:  Alert, cooperative, no distress. Head:  Normocephalic, without obvious abnormality, atraumatic.    Respiratory:  Heart:   Non labored breathing  Regular rate and rhythm, no murmurs   Neck: Extremities: Warm, no cyanosis or edema. Pulses: 2+ radial pulses. Neurologic:  MS: Alert and oriented x 4, speech - hypophonia. Language intact. Attention and fund of knowledge appropriate. Recent and remote memory intact. Cranial Nerves:  II: visual fields    II: pupils    II: optic disc    III,VII: ptosis none   III,IV,VI: extraocular muscles  EOMI, no nystagmus, no diplopia   V: facial light touch sensation     VII: facial muscle function   symmetric   VIII: hearing intact   IX: soft palate elevation     XI: trapezius strength     XI: sternocleidomastoid strength    XII: tongue       Motor: normal bulk, +Cogwheel rigidity L>R, no tremor, mild bradykinesia,  Strength: 5/5 throughout, no PD  Sensory:   Coordination: FTN and ОЛЬГА intact  Gait: Slight shuffling, does not lift right leg as well as left, dec arm swing in right arm, improved posture, extra step to turn. Reflexes:            Assessment/Plan   Pt is a 65 y.o. right handed male with PD, diagnosed in 2008, reporting an action tremor, drags his right foot, hypophonia, no freezing, no wearing off, no swallowing difficulty, no NOH, no hallucinations. Increase in tremor recently, +constipation. Exam with hypophonia slight shuffling does not lift right leg fully, decreased arm swing on the right, improved posture, extra step to turn. No resting tremor seen. Continue Carbidopa-levodopa 25-250mg, 1 tab qid. Start Comtan 200mg qid with Carbidopa-levodopa. Pt will ask PCP about diazepam dosing. Encouraged to increase calorie intake. Follow-up in the clinic in 3 months with me or NP, instructed to call me interim with any questions or concerns. ICD-10-CM ICD-9-CM    1. Parkinson's disease ([facility]  G20 332.0        Signed:   Renzo Ellington MD  3/22/2021

## 2021-03-22 NOTE — LETTER
3/22/2021 Patient: Santos Candelaria YOB: 1950 Date of Visit: 3/22/2021 Georgia Rojas MD 
[address] [facility] [id number] Via Fax: [phone] Dear Georgia Rojas MD, Thank you for referring Mr. Marcela Gayle to [address] [place] [address] for evaluation. My notes for this consultation are attached. If you have questions, please do not hesitate to call me. I look forward to following your patient along with you. Sincerely, Rebekah Stanton MD

## 2021-03-22 NOTE — PATIENT INSTRUCTIONS
Ask PCP about why there was a dose increase in diazepam.  Start Comtan, 1 tab with each dose of carbidopa-levodopa.

## 2021-06-18 RX ORDER — ENTACAPONE 200 MG/1
TABLET ORAL
Qty: 360 TABLET | Refills: 1 | Status: SHIPPED | OUTPATIENT
Start: 2021-06-18 | End: 2021-07-11 | Stop reason: SDUPTHER

## 2021-06-29 ENCOUNTER — OFFICE VISIT (OUTPATIENT)
Dept: NEUROLOGY | Age: 71
End: 2021-06-29
Payer: MEDICARE

## 2021-06-29 VITALS
HEART RATE: 101 BPM | BODY MASS INDEX: 17.58 KG/M2 | DIASTOLIC BLOOD PRESSURE: 60 MMHG | SYSTOLIC BLOOD PRESSURE: 110 MMHG | OXYGEN SATURATION: 96 % | HEIGHT: 72 IN | WEIGHT: 129.8 LBS

## 2021-06-29 DIAGNOSIS — G20 PARKINSON'S DISEASE (HCC): Primary | ICD-10-CM

## 2021-06-29 DIAGNOSIS — R26.9 GAIT DISTURBANCE: ICD-10-CM

## 2021-06-29 PROCEDURE — 3017F COLORECTAL CA SCREEN DOC REV: CPT | Performed by: PSYCHIATRY & NEUROLOGY

## 2021-06-29 PROCEDURE — G8536 NO DOC ELDER MAL SCRN: HCPCS | Performed by: PSYCHIATRY & NEUROLOGY

## 2021-06-29 PROCEDURE — G8419 CALC BMI OUT NRM PARAM NOF/U: HCPCS | Performed by: PSYCHIATRY & NEUROLOGY

## 2021-06-29 PROCEDURE — G8510 SCR DEP NEG, NO PLAN REQD: HCPCS | Performed by: PSYCHIATRY & NEUROLOGY

## 2021-06-29 PROCEDURE — G8427 DOCREV CUR MEDS BY ELIG CLIN: HCPCS | Performed by: PSYCHIATRY & NEUROLOGY

## 2021-06-29 PROCEDURE — 1101F PT FALLS ASSESS-DOCD LE1/YR: CPT | Performed by: PSYCHIATRY & NEUROLOGY

## 2021-06-29 PROCEDURE — 99214 OFFICE O/P EST MOD 30 MIN: CPT | Performed by: PSYCHIATRY & NEUROLOGY

## 2021-06-29 RX ORDER — CARBIDOPA AND LEVODOPA 25; 250 MG/1; MG/1
1 TABLET ORAL
Qty: 450 TABLET | Refills: 1 | Status: SHIPPED | OUTPATIENT
Start: 2021-06-29 | End: 2022-02-17

## 2021-06-29 NOTE — PROGRESS NOTES
Neurology Consult Note      HISTORY PROVIDED BY: patient    Chief Complaint:   Chief Complaint   Patient presents with    Follow-up    Parkinsons Disease      Subjective:   Pt is a 66 y.o. right handed male last seen in clinic on 3/22/21 in f/u for PD, diagnosed in 2008, reporting an action tremor, drags his right foot, hypophonia, no freezing, no wearing off, no swallowing difficulty, no NOH, no hallucinations. Increase in tremor recently, +constipation. Exam with hypophonia slight shuffling does not lift right leg fully, decreased arm swing on the right, improved posture, extra step to turn. No resting tremor seen. Continued Carbidopa-levodopa 25-250mg, 1 tab qid. Started Comtan 200mg qid with Carbidopa-levodopa. Pt will ask PCP about diazepam dosing. Encouraged to increase calorie intake. He returns for f/u. Has noticed increased tremor in right hand with eating, dragging foot more. No falls. No wearing off. No dyskinesias. No hallucinations, no RBD, no NOH. Has had constipation, not severe. Still has no appetite, drinking protein shakes. Still taking Valium 5mg tid and Remeron 30mg qhs from PCP. Taking Carbidopa - Levodopa 25-250mg 9A, 2P, 6P, 1A. No trouble getting out of bed or rolling over in bed.      Symptoms:   Tremors/Shaking Yes  Problems with balance, shuffling walk, falls: no falls, +shuffling  Slowness (bradykinesia): yes  Freezing or wearing off, off time:none  Soft voice, issues swallowing: +soft voice, no swallowing issues  Sudden, erratic movements (Dyskinesias): none   Memory loss, difficulty thinking or remembering clearly: yes - likely normal for age  Depression/anxiety: No  Difficulty sleeping, issues with talking in sleep or acting out dreams: no  Constipation: Yes  Hallucinations/delusions: No  Nausea: None   Lightheadedness, positional dizziness: No  Compulsive behaviors/urges None   Morning akinesia : No     Past Medical History:   Diagnosis Date    [facility]ortic atherosclerosis ([facility]     Arthritis     Chronic obstructive pulmonary disease (HCC)     emphysema    Depression     History of left shoulder fracture     Hyperlipidemia     Hypertension     Ill-defined condition     hx dislocated left shoulder    Ill-defined condition     prostate ca/ seed implant    Lumbar spinal stenosis     PT/injection/accupunture/exercise [facility] 2 tmes a week    Parkinson's disease ([facility]     Shingles     Stroke ([facility] 2002    no residual problems      Past Surgical History:   Procedure Laterality Date    COLONOSCOPY N/A 2/19/2020    COLON[facility] performed by Campos [facility] at [facility] ENDOSCOPY    HX CATARACT REMOVAL Bilateral     HX ORTHOPAEDIC      steroid injections in back d/t spinal stenosis    HX TONSILLECTOMY        Social History     Socioeconomic History    Marital status:      Spouse name: Not on file    Number of children: Not on file    Years of education: Not on file    Highest education level: Not on file   Occupational History    Not on file   Tobacco Use    Smoking status: Former Smoker    Smokeless tobacco: Never Used    Tobacco comment: quit 31 yrs ago   Substance and Sexual Activity    Alcohol use: Yes     Alcohol/week: 7.0 standard drinks     Types: 7 Glasses of wine per week     Comment: wine with dinner/mixed drink    Drug use: No    Sexual activity: Not Currently   Other Topics Concern    Not on file   Social History Narrative    Lives in [place] alone, does not drive     Social Determinants of Health     Financial Resource Strain:     Difficulty of Paying Living Expenses:    Food Insecurity:     Worried About Running Out of Food in the Last Year:     [address] in the Last Year:    Transportation Needs:     Lack of Transportation (Medical):      Lack of Transportation (Non-Medical):    Physical Activity:     Days of Exercise per Week:     Minutes of Exercise per Session:    Stress:     Feeling of Stress :    Social Connections:     Frequency of Communication with Friends and Family:     Frequency of Social Gatherings with Friends and Family:     Attends [employer] Services:     Active Member of Clubs or Organizations:     Attends Club or Organization Meetings:     Marital Status:    Intimate Partner Violence:     Fear of Current or Ex-Partner:     Emotionally Abused:     Physically Abused:     Sexually Abused:      Family History   Problem Relation Age of Onset    Other Father         kidney removed - unsure of reason, was estranged    Cancer Maternal Aunt         ? where    Heart Attack Paternal Uncle     Cancer Maternal Grandmother         ? where    Kidney Disease Maternal Aunt    [address]         ? where - 35s    Heart Attack Paternal Uncle     Kidney Disease Cousin         kidney failure    Alcohol abuse Cousin     Dementia Mother         she is currently 81yo    No Known Problems Daughter          Objective:   ROS:  Per HPI or PMH o/w neg    Allergies   Allergen Reactions    Lisinopril Cough    Other Medication Rash     Vaccine for polio caused a rash. Meds:    Current Outpatient Medications:     entacapone (COMTAN) 200 mg tablet, TAKE 1 TABLET BY MOUTH FOUR (4) TIMES DAILY FOR 90 DAYS., Disp: 360 Tablet, Rfl: 1    diazePAM (VALIUM) 5 mg tablet, Take 5 mg by mouth three (3) times daily. , Disp: , Rfl:     fluticasone propion-salmeteroL (ADVAIR/WIXELA) 500-50 mcg/dose diskus inhaler, Take 1 Puff by inhalation daily. , Disp: , Rfl:     mirtazapine (REMERON) 30 mg tablet, Take 30 mg by mouth nightly., Disp: , Rfl:     pantoprazole (PROTONIX) 40 mg tablet, Take 40 mg by mouth daily. , Disp: , Rfl:     cyclobenzaprine (FLEXERIL) 10 mg tablet, Take 1 Tab by mouth nightly., Disp: 7 Tab, Rfl: 0    cholecalciferol (Vitamin D3) (1000 Units /25 mcg) tablet, Take  by mouth daily. , Disp: , Rfl:     carbidopa-levodopa (SINEMET)  mg per tablet, Take 1 Tab by mouth four (4) times daily. , Disp: 360 Tab, Rfl: 1   citalopram (CELEXA) 10 mg tablet, Take 10 mg by mouth daily. , Disp: , Rfl:     tamsulosin HCl (TAMSULOSIN PO), Take 0.4 mg by mouth nightly. tamsulosin ER, Disp: , Rfl:     riboflavin, vitamin B2, (VITAMIN B-2) 100 mg tablet, Take 100 mg by mouth daily. , Disp: , Rfl:     atorvastatin (LIPITOR) 20 mg tablet, Take 20 mg by mouth daily. , Disp: , Rfl:     naproxen (NAPROSYN) 500 mg tablet, Take 1 Tab by mouth every twelve (12) hours as needed for Pain. (Patient not taking: Reported on 6/29/2021), Disp: 20 Tab, Rfl: 0    ondansetron hcl (ZOFRAN) 8 mg tablet, Take 8 mg by mouth three (3) times daily as needed for Nausea. (Patient not taking: Reported on 6/29/2021), Disp: , Rfl:     albuterol (VENTOLIN HFA) 90 mcg/actuation inhaler, Take 2 Puffs by inhalation two (2) times daily as needed for Wheezing. (Patient not taking: Reported on 6/29/2021), Disp: , Rfl:     budesonide-formoterol (SYMBICORT) 80-4.5 mcg/actuation HFAA inhaler, Take 2 Puffs by inhalation two (2) times a day. (Patient not taking: Reported on 6/29/2021), Disp: , Rfl:     Imaging:  MRI Results (most recent):  Results from [place] encounter on 06/17/15    MRI LUMB SPINE WO CONT    Narrative  **Final Report**      ICD Codes / Adm. Diagnosis: 724.4  722.52 / Thoracic or lumbosacral neurit  Degeneration of lumbar or shine  Examination:  MR L SPINE WO CON  - [id number] - Jun 17 2015  1:49PM  Accession No:  [id number]  Reason:      REPORT:  Clinical indication: Back pain. Technical factors: Sagittal T1-weighted T2-weighted and STIR, axial T1 and  T2-weighted L1-S1. There is some mild bone marrow edema is seen in the chair endplate of L4  possibly degenerative in nature. There is no evidence for marrow  replacement. The alignment is satisfactory. The conus appears unremarkable. No paraspinal masses or fluid collections. L1-L2 shows no focal findings.   L2-L3 shows minimal facet disease and ligamentum hypertrophy but no canal or  foraminal compromise. L3-L4 shows some minimal facet disease and ligamentum thickening no focal  findings. L4-5 show a left lateral disc protrusion with some encroachment on the  foramen and possible involvement of the exiting nerve roots. There is no  canal compromise. L5-S1 show no focal findings. Impression  : Left lateral disc protrusion L4-5. Signing/Reading Doctor: Jennifer Hawkins ([id number])  Approved: Jennifer Hawkins ([id number])  Jun 17 2015  2:37PM     CT Results (most recent):  Results from Hospital Encounter encounter on 08/21/19    CT ABD PELV W CONT    Narrative  EXAM: CT ABD PELV W CONT    INDICATION: Prostate cancer ([address]    COMPARISON: The CT examination of the chest of 11/1/2018 is available for  correlation    CONTRAST: 100 mL of Isovue-370. TECHNIQUE:  Following the uneventful intravenous administration of contrast, thin axial  images were obtained through the abdomen and pelvis. Coronal and sagittal  reconstructions were generated. Oral contrast was not administered. CT dose  reduction was achieved through use of a standardized protocol tailored for this  examination and automatic exposure control for dose modulation. FINDINGS:  The images of the lung bases revealed no evidence of nodularity. The liver measures 17.2 cm longitudinally. No focal hepatic lesions are seen. A  focal, ovoid soft tissue density is noted in the dependent portion of the  gallbladder (see axial image 34 and coronal image 47). This finding measures 1.1  cm by 0.6 cm in size. This density is not calcified. This may possibly represent  the presence of sludge versus a noncalcified gallstone. No gallbladder wall  thickening is evident. There is no evidence of pericholecystic fluid or  inflammatory change. The spleen measures 9.5 cm longitudinally. No focal splenic  lesions are seen. The kidneys are normal in size. No renal calculi are evident. There is no evidence of hydronephrotic change.  The urinary bladder was less than  optimally distended. There is enlargement of the prostate gland. There is  inhomogeneity of the prostate gland. No focal nodules are identified. There is  tortuosity of the normal caliber abdominal aorta present (see coronal image 52). A moderate amount of gas and fecal material is noted within the colon. There is  relative prominence of the fluid-filled duodenum. A moderately large amount of  fluid is noted within the stomach (see axial image 15). There is no evidence of  mural thickening involving the bowel. Tiny low-density areas are noted within  the kidneys. Specifically, a 0.6 cm low-density area is noted in the upper pole  of the right kidney and a 0.3 cm low-density areas noted within the left kidney. These appear to represent cysts. There is evidence of colonic diverticulosis. There is subtle indistinctness of  the pericolonic fat (particularly in the region of the hepatic flexure/proximal  transverse colon). This may represent mild inflammatory change/developing  diverticulitis. Clinical correlation is indicated. There is no evidence of  intra-abdominal/intrapelvic or inguinal lymphadenopathy. Impression  IMPRESSION:  1. Presence of a focal soft tissue density in the dependent portion of the  gallbladder as described above. No evidence of gallbladder wall thickening or  pericholecystic fluid/inflammatory change. 2. Presence of a moderate amount of gas and fecal material within the colon. Evidence of colonic diverticulosis. Subtle findings suggestive of developing,  mild inflammatory change in the region of the hepatic flexure/proximal  transverse colon. 3. Suboptimal distention of the urinary bladder. 4. Enlargement of the prostate gland. Normal sized kidneys. No evidence of  urolithiasis or hydronephrotic change. Presence of tiny low-density areas within  the kidneys suggestive of cysts.        Reviewed records in [facility] [employer] tab today    Lab Review   Results for orders placed or performed during the hospital encounter of 11/21/20   CBC WITH AUTOMATED DIFF   Result Value Ref Range    WBC 9.7 4.1 - 11.1 K/uL    RBC 4.05 (L) 4.10 - 5.70 M/uL    HGB 12.8 12.1 - 17.0 g/dL    HCT 38.6 36.6 - 50.3 %    MCV 95.3 80.0 - 99.0 FL    MCH 31.6 26.0 - 34.0 PG    MCHC 33.2 30.0 - 36.5 g/dL    RDW 13.9 11.5 - 14.5 %    PLATELET 156 951 - 007 K/uL    MPV 10.6 8.9 - 12.9 FL    NRBC 0.0 0  WBC    ABSOLUTE NRBC 0.00 0.00 - 0.01 K/uL    NEUTROPHILS 83 (H) 32 - 75 %    LYMPHOCYTES 8 (L) 12 - 49 %    MONOCYTES 9 5 - 13 %    EOSINOPHILS 0 0 - 7 %    BASOPHILS 0 0 - 1 %    IMMATURE GRANULOCYTES 0 0.0 - 0.5 %    ABS. NEUTROPHILS 7.9 1.8 - 8.0 K/UL    ABS. LYMPHOCYTES 0.8 0.8 - 3.5 K/UL    ABS. MONOCYTES 0.9 0.0 - 1.0 K/UL    ABS. EOSINOPHILS 0.0 0.0 - 0.4 K/UL    ABS. BASOPHILS 0.0 0.0 - 0.1 K/UL    ABS. IMM. GRANS. 0.0 0.00 - 0.04 K/UL    DF AUTOMATED     METABOLIC PANEL, COMPREHENSIVE   Result Value Ref Range    Sodium 144 136 - 145 mmol/L    Potassium 3.9 3.5 - 5.1 mmol/L    Chloride 108 97 - 108 mmol/L    CO2 27 21 - 32 mmol/L    Anion gap 9 5 - 15 mmol/L    Glucose 92 65 - 100 mg/dL    BUN 15 6 - 20 MG/DL    Creatinine 1.16 0.70 - 1.30 MG/DL    BUN/Creatinine ratio 13 12 - 20      GFR est AA >60 >60 ml/min/1.73m2    GFR est non-AA >60 >60 ml/min/1.73m2    Calcium 8.6 8.5 - 10.1 MG/DL    Bilirubin, total 0.6 0.2 - 1.0 MG/DL    ALT (SGPT) 9 (L) 12 - 78 U/L    AST (SGOT) 12 (L) 15 - 37 U/L    Alk. phosphatase 92 45 - 117 U/L    Protein, total 6.4 6.4 - 8.2 g/dL    Albumin 3.2 (L) 3.5 - 5.0 g/dL    Globulin 3.2 2.0 - 4.0 g/dL    A-G Ratio 1.0 (L) 1.1 - 2.2     OCCULT BLOOD, STOOL   Result Value Ref Range    Occult blood, stool Negative NEG          Exam:  Visit Vitals  /60   Pulse (!) 101   Ht 6' (1.829 m)   Wt 129 lb 12.8 oz (58.9 kg)   SpO2 96%   BMI 17.60 kg/m²     General:  Alert, cooperative, no distress. Head:  Normocephalic, without obvious abnormality, atraumatic.    Respiratory:  Heart: Non labored breathing  Regular rate and rhythm, no murmurs   Neck:      Extremities: Warm, no cyanosis or edema. Pulses: 2+ radial pulses. Neurologic:  MS: Alert and oriented x 4, speech - hypophonia. Language intact. Attention and fund of knowledge appropriate. Recent and remote memory intact. Cranial Nerves:  II: visual fields    II: pupils    II: optic disc    III,VII: ptosis none   III,IV,VI: extraocular muscles  EOMI, no nystagmus, no diplopia   V: facial light touch sensation     VII: facial muscle function   symmetric   VIII: hearing intact   IX: soft palate elevation     XI: trapezius strength     XI: sternocleidomastoid strength    XII: tongue       Motor: normal bulk, +Cogwheel rigidity L>R, no tremor, mild bradykinesia,  Strength: 5/5 throughout, no PD  Sensory:   Coordination: FTN and ОЛЬГА intact  Gait: Shuffling, does not lift right leg as well as left, dec arm swing in right arm, stooped posture, extra step to turn. Reflexes:            Assessment/Plan   Pt is a 66 y.o. right handed male with PD, diagnosed in 2008, reporting an action tremor, drags his right foot, hypophonia, no freezing, no wearing off, no swallowing difficulty, no NOH, no hallucinations. Increase in tremor in right hand, dragging foot more. Exam with hypophonia shuffling does not lift right leg fully, decreased arm swing on the right, stooped posture, extra step to turn. No resting tremor seen. Gait is significantly worse. Increase Carbidopa-levodopa 25-250mg, 1 tab 5 times a day. Continue Comtan 200mg 5 times a day with Carbidopa-levodopa. Referral to PT for gait. Follow-up in the clinic in 4 months, instructed to call in the interim with any questions or concerns. ICD-10-CM ICD-9-CM    1. [facility] ([facility]  G20 332.0 REFERRAL TO PHYSICAL THERAPY   2. Gait disturbance  R26.9 781.2 REFERRAL TO PHYSICAL THERAPY       Signed:   Ana Arnold MD  6/29/2021

## 2021-06-29 NOTE — LETTER
7/11/2021    Patient: Traci Hernandez   YOB: 1950   Date of Visit: 6/29/2021     Aviva Rojas MD  [facility] [phone]  Via Fax: [phone]    Dear Aviva Rojas MD,      Thank you for referring Mr. Mela Cruz to [address] [place] [address] for evaluation. My notes for this consultation are attached. If you have questions, please do not hesitate to call me. I look forward to following your patient along with you.       Sincerely,    Jodie Villeda MD

## 2021-07-11 RX ORDER — ENTACAPONE 200 MG/1
200 TABLET ORAL
Qty: 450 TABLET | Refills: 1 | Status: SHIPPED | OUTPATIENT
Start: 2021-07-11 | End: 2022-02-17

## 2021-09-02 ENCOUNTER — TRANSCRIBE ORDER (OUTPATIENT)
Dept: SCHEDULING | Age: 71
End: 2021-09-02

## 2021-09-02 DIAGNOSIS — J43.9 EMPHYSEMATOUS BLEB (HCC): Primary | ICD-10-CM

## 2021-09-02 DIAGNOSIS — C61 PROSTATE CANCER (HCC): Primary | ICD-10-CM

## 2021-09-08 ENCOUNTER — HOSPITAL ENCOUNTER (OUTPATIENT)
Dept: CT IMAGING | Age: 71
Discharge: HOME | End: 2021-09-08
Attending: FAMILY MEDICINE
Payer: MEDICARE

## 2021-09-08 DIAGNOSIS — J43.9 EMPHYSEMATOUS BLEB (HCC): ICD-10-CM

## 2021-09-08 DIAGNOSIS — C61 PROSTATE CANCER (HCC): ICD-10-CM

## 2021-09-08 LAB — CREAT BLD-MCNC: 0.9 MG/DL (ref 0.6–1.3)

## 2021-09-08 PROCEDURE — 74177 CT ABD & PELVIS W/CONTRAST: CPT

## 2021-09-08 PROCEDURE — 82565 ASSAY OF CREATININE: CPT

## 2021-09-08 PROCEDURE — 74011000636 HC RX REV CODE- 636: Performed by: RADIOLOGY

## 2021-09-08 PROCEDURE — 74011000250 HC RX REV CODE- 250: Performed by: RADIOLOGY

## 2021-09-08 PROCEDURE — 71260 CT THORAX DX C+: CPT

## 2021-09-08 RX ORDER — BARIUM SULFATE 20 MG/ML
450 SUSPENSION ORAL
Status: COMPLETED | OUTPATIENT
Start: 2021-09-08 | End: 2021-09-08

## 2021-09-08 RX ADMIN — BARIUM SULFATE 900 ML: 20 SUSPENSION ORAL at 11:41

## 2021-09-08 RX ADMIN — IOPAMIDOL 100 ML: 755 INJECTION, SOLUTION INTRAVENOUS at 11:41

## 2021-10-19 ENCOUNTER — TRANSCRIBE ORDER (OUTPATIENT)
Dept: SCHEDULING | Age: 71
End: 2021-10-19

## 2021-10-19 DIAGNOSIS — C61 MALIGNANT NEOPLASM OF PROSTATE (HCC): ICD-10-CM

## 2021-10-19 DIAGNOSIS — R64 CACHEXIA (HCC): Primary | ICD-10-CM

## 2021-10-29 ENCOUNTER — HOSPITAL ENCOUNTER (OUTPATIENT)
Dept: NUCLEAR MEDICINE | Age: 71
Discharge: HOME | End: 2021-10-29
Attending: FAMILY MEDICINE
Payer: MEDICARE

## 2021-10-29 DIAGNOSIS — C61 MALIGNANT NEOPLASM OF PROSTATE (HCC): ICD-10-CM

## 2021-10-29 DIAGNOSIS — R64 CACHEXIA (HCC): ICD-10-CM

## 2021-10-29 PROCEDURE — 78306 BONE IMAGING WHOLE BODY: CPT

## 2021-11-04 ENCOUNTER — OFFICE VISIT (OUTPATIENT)
Dept: NEUROLOGY | Age: 71
End: 2021-11-04
Payer: MEDICARE

## 2021-11-04 VITALS
WEIGHT: 119.6 LBS | HEART RATE: 80 BPM | SYSTOLIC BLOOD PRESSURE: 130 MMHG | OXYGEN SATURATION: 98 % | BODY MASS INDEX: 16.2 KG/M2 | HEIGHT: 72 IN | DIASTOLIC BLOOD PRESSURE: 80 MMHG

## 2021-11-04 DIAGNOSIS — R63.0 POOR APPETITE: ICD-10-CM

## 2021-11-04 DIAGNOSIS — G20 PARKINSON'S DISEASE (HCC): Primary | ICD-10-CM

## 2021-11-04 PROCEDURE — G8419 CALC BMI OUT NRM PARAM NOF/U: HCPCS | Performed by: PSYCHIATRY & NEUROLOGY

## 2021-11-04 PROCEDURE — G8510 SCR DEP NEG, NO PLAN REQD: HCPCS | Performed by: PSYCHIATRY & NEUROLOGY

## 2021-11-04 PROCEDURE — 99213 OFFICE O/P EST LOW 20 MIN: CPT | Performed by: PSYCHIATRY & NEUROLOGY

## 2021-11-04 PROCEDURE — 3017F COLORECTAL CA SCREEN DOC REV: CPT | Performed by: PSYCHIATRY & NEUROLOGY

## 2021-11-04 PROCEDURE — G8427 DOCREV CUR MEDS BY ELIG CLIN: HCPCS | Performed by: PSYCHIATRY & NEUROLOGY

## 2021-11-04 PROCEDURE — G8536 NO DOC ELDER MAL SCRN: HCPCS | Performed by: PSYCHIATRY & NEUROLOGY

## 2021-11-04 PROCEDURE — 1101F PT FALLS ASSESS-DOCD LE1/YR: CPT | Performed by: PSYCHIATRY & NEUROLOGY

## 2021-11-04 NOTE — PATIENT INSTRUCTIONS
Start trial of lowering Carbidopa-levodopa from 5 times a day to see if your appetite improves. Start with 4 times a day and wait a week and see how you do. If stable, lower to 3 times a day, and so on. If your parkinsons worsens, your tremor increases, your gait slows or you get stuck, then we will have to go back up on meds or we can try a different medication.

## 2021-11-04 NOTE — PROGRESS NOTES
Neurology Consult Note      HISTORY PROVIDED BY: patient    Chief Complaint:   Chief Complaint   Patient presents with    Parkinsons Disease    Follow-up      Subjective:   Pt is a 66 y.o. right handed male last seen in clinic on 6/29/21 in f/u for PD, diagnosed in 2008, reporting an action tremor, drags his right foot, hypophonia, no freezing, no wearing off, no swallowing difficulty, no NOH, no hallucinations. Increase in tremor in right hand, dragging foot more. Exam with hypophonia shuffling does not lift right leg fully, decreased arm swing on the right, stooped posture, extra step to turn. No resting tremor seen. Gait is significantly worse. Increased Carbidopa-levodopa 25-250mg, 1 tab 5 times a day. Continued Comtan 200mg 5 times a day with Carbidopa-levodopa. Referred to PT for gait. He returns for f/u. He has continued to have wt loss, down to 119lbs, was 129 at last visit. He is undergoing extensive work up without etiology found, still has colonoscopy pending. He was going to PT up until Labor day, stopped due to LE edema, attributed to low protein. He is drinking Ensure. Still taking Carbidopa-levodopa 1 tab, 5 times a day and Comtan 5 times a day. May occasionally miss a dose of medication, doesn't notice a difference. No nausea. Appetite has increased the last couple of days.      Symptoms:   Tremors/Shaking Yes  Problems with balance, shuffling walk, falls: no falls, +shuffling  Slowness (bradykinesia): yes  Freezing or wearing off, off time:none  Soft voice, issues swallowing: +soft voice, no swallowing issues  Sudden, erratic movements (Dyskinesias): none   Memory loss, difficulty thinking or remembering clearly: yes - likely normal for age  Depression/anxiety: No  Difficulty sleeping, issues with talking in sleep or acting out dreams: no  Constipation: No severe constipation   Hallucinations/delusions: No  Nausea: None   Lightheadedness, positional dizziness: No  Compulsive behaviors/urges None   Morning akinesia : No     Past Medical History:   Diagnosis Date    Aortic atherosclerosis (HCC)     Arthritis     Chronic obstructive pulmonary disease (HCC)     emphysema    Depression     History of left shoulder fracture     Hyperlipidemia     Hypertension     Ill-defined condition     hx dislocated left shoulder    Ill-defined condition     prostate ca/ seed implant    Lumbar spinal stenosis     PT/injection/accupunture/exercise [employer] 2 tmes a week    Parkinson's disease ([address]     Shingles     Stroke ([facility] [address] 2002    no residual problems      Past Surgical History:   Procedure Laterality Date    COLONOSCOPY N/A 2/19/2020    COL[facility] performed [facility]ar [facility] ENDOSCOPY    HX CATARACT REMOVAL Bilateral     HX ORTHOPAEDIC      steroid injections in back d/t spinal stenosis    HX TONSILLECTOMY        Social History     Socioeconomic History    Marital status:      Spouse name: Not on file    Number of children: Not on file    Years of education: Not on file    Highest education level: Not on file   Occupational History    Not on file   Tobacco Use    Smoking status: Former Smoker    Smokeless tobacco: Never Used    Tobacco comment: quit 31 yrs ago   Substance and Sexual Activity    Alcohol use: Yes     Alcohol/week: 7.0 standard drinks     Types: 7 Glasses of wine per week     Comment: wine with dinner/mixed drink    Drug use: No    Sexual activity: Not Currently   Other Topics Concern    Not on file   Social History Narrative    Lives in [place] alone, does not drive     Social Determinants of Health     Financial Resource Strain:     Difficulty of Paying Living Expenses: Not on file   Food Insecurity:     Worried About Running Out of Food in the Last Year: Not on file    Indira of Food in the Last Year: Not on file   Transportation Needs:     Lack of Transportation (Medical): Not on file    Lack of Transportation (Non-Medical):  Not on file   Physical Activity:     Days of Exercise per Week: Not on file    Minutes of Exercise per Session: Not on file   Stress:     Feeling of Stress : Not on file   Social Connections:     Frequency of Communication with Friends and Family: Not on file    Frequency of Social Gatherings with Friends and Family: Not on file    Attends Yazidism Services: Not on file    Active Member of [address] [place] [address] or Organizations: Not on file    Attends Club or Organization Meetings: Not on file    Marital Status: Not on file   Intimate Partner Violence:     Fear of Current or Ex-Partner: Not on file    Emotionally Abused: Not on file    Physically Abused: Not on file    Sexually Abused: Not on file   Housing Stability:     Unable to Pay for Housing in the Last Year: Not on file    Number of [place] in the Last Year: Not on file    Unstable Housing in the Last Year: Not on file     Family History   Problem Relation Age of Onset    Other Father         kidney removed - unsure of reason, was estranged    Cancer Maternal Aunt         ? where    Heart Attack Paternal Uncle     Cancer Maternal Grandmother         ? where    Kidney Disease Maternal [address]         ? where - 35s    Heart Attack Paternal Uncle     Kidney Disease Cousin         kidney failure    Alcohol abuse Cousin     Dementia Mother         she is currently 81yo    No Known Problems Daughter          Objective:   ROS:  Per HPI or PMH o/w neg    Allergies   Allergen Reactions    Lisinopril Cough    Other Medication Rash     Vaccine for polio caused a rash. Meds:    Current Outpatient Medications:     entacapone (COMTAN) 200 mg tablet, Take 1 Tablet by mouth five (5) times daily. With carbidopa-levodopa, Disp: 450 Tablet, Rfl: 1    carbidopa-levodopa (SINEMET)  mg per tablet, Take 1 Tablet by mouth five (5) times daily. , Disp: 450 Tablet, Rfl: 1    fluticasone propion-salmeteroL (ADVAIR/WIXELA) 500-50 mcg/dose diskus inhaler, Take 1 Puff by inhalation daily. , Disp: , Rfl:     mirtazapine (REMERON) 30 mg tablet, Take 30 mg by mouth nightly., Disp: , Rfl:     pantoprazole (PROTONIX) 40 mg tablet, Take 40 mg by mouth daily. , Disp: , Rfl:     cyclobenzaprine (FLEXERIL) 10 mg tablet, Take 1 Tab by mouth nightly., Disp: 7 Tab, Rfl: 0    naproxen (NAPROSYN) 500 mg tablet, Take 1 Tab by mouth every twelve (12) hours as needed for Pain., Disp: 20 Tab, Rfl: 0    cholecalciferol (Vitamin D3) (1000 Units /25 mcg) tablet, Take  by mouth daily. , Disp: , Rfl:     citalopram (CELEXA) 10 mg tablet, Take 10 mg by mouth daily. , Disp: , Rfl:     ondansetron hcl (ZOFRAN) 8 mg tablet, Take 8 mg by mouth three (3) times daily as needed for Nausea., Disp: , Rfl:     tamsulosin HCl (TAMSULOSIN PO), Take 0.4 mg by mouth nightly. tamsulosin ER, Disp: , Rfl:     albuterol (VENTOLIN HFA) 90 mcg/actuation inhaler, Take 2 Puffs by inhalation two (2) times daily as needed for Wheezing., Disp: , Rfl:     riboflavin, vitamin B2, (VITAMIN B-2) 100 mg tablet, Take 100 mg by mouth daily. , Disp: , Rfl:     atorvastatin (LIPITOR) 20 mg tablet, Take 20 mg by mouth daily. , Disp: , Rfl:     budesonide-formoterol (SYMBICORT) 80-4.5 mcg/actuation HFAA inhaler, Take 2 Puffs by inhalation two (2) times a day., Disp: , Rfl:     diazePAM (VALIUM) 5 mg tablet, Take 5 mg by mouth three (3) times daily. (Patient not taking: Reported on 11/4/2021), Disp: , Rfl:     Imaging:  MRI Results (most recent):  Results from [place] encounter on 06/17/15    MRI LUMB SPINE WO CONT    Narrative  **Final Report**      ICD Codes / Adm. Diagnosis: 724.4  722.52 / Thoracic or lumbosacral neurit  Degeneration of lumbar or shine  Examination:  MR L SPINE WO CON  - [id number] - Jun 17 2015  1:49PM  Accession No:  [id number]  Reason:      REPORT:  Clinical indication: Back pain. Technical factors: Sagittal T1-weighted T2-weighted and STIR, axial T1 and  T2-weighted L1-S1.     There is some mild bone marrow edema is seen in the chair endplate of L4  possibly degenerative in nature. There is no evidence for marrow  replacement. The alignment is satisfactory. The conus appears unremarkable. No paraspinal masses or fluid collections. L1-L2 shows no focal findings. L2-L3 shows minimal facet disease and ligamentum hypertrophy but no canal or  foraminal compromise. L3-L4 shows some minimal facet disease and ligamentum thickening no focal  findings. L4-5 show a left lateral disc protrusion with some encroachment on the  foramen and possible involvement of the exiting nerve roots. There is no  canal compromise. L5-S1 show no focal findings. Impression  : Left lateral disc protrusion L4-5. Signing/Reading Doctor: Ghada Marin ([id number])  Approved: Ghada Marin ([id number])  Jun 17 2015  2:37PM     CT Results (most recent):  Results from Hospital Encounter encounter on 09/08/21    CT CHEST W CONT    Narrative  INDICATION: Emphysematous bleb, weight loss    COMPARISON: 2018    TECHNIQUE:  Following the uneventful intravenous administration of 95 cc  Isovue-300, 5 mm axial images were obtained through the thorax. Coronal and  sagittal reformats were generated. CT dose reduction was achieved through use  of a standardized protocol tailored for this examination and automatic exposure  control for dose modulation. FINDINGS:    CHEST WALL: No mass or axillary lymphadenopathy. THYROID: No nodule. MEDIASTINUM: No mass or lymphadenopathy. FELY: No mass or lymphadenopathy. THORACIC AORTA: No dissection or aneurysm. MAIN PULMONARY ARTERY: Normal in caliber. TRACHEA/BRONCHI: Patent. ESOPHAGUS: No wall thickening or dilatation. HEART: Normal in size. PLEURA: No effusion or pneumothorax. LUNGS: Panlobular emphysema. Mild scarring in the right upper lobe. BONES: Sclerotic lesion in the thoracic spine is stable. Impression  Panlobular emphysema suggesting alpha-1 antitrypsin deficiency.   No acute findings. Reviewed records in [facility] and [employer] tab today    Lab Review   Results for orders placed or performed during the hospital encounter of 09/08/21   CREATININE, POC   Result Value Ref Range    Creatinine (POC) 0.90 0.6 - 1.3 mg/dL    GFRAA, POC >60 >60 ml/min/1.73m2    GFRNA, POC >60 >60 ml/min/1.73m2        Exam:  Visit Vitals  /80   Pulse 80   Ht 6' (1.829 m)   Wt 119 lb 9.6 oz (54.3 kg)   SpO2 98%   BMI 16.22 kg/m²     General:  Alert, cooperative, no distress. Head:  Normocephalic, without obvious abnormality, atraumatic. Respiratory:  Heart:   Non labored breathing  Regular rate and rhythm, no murmurs   Neck:      Extremities: Warm, no cyanosis or edema. Pulses: 2+ radial pulses. Neurologic:  MS: Alert and oriented x 4, speech - hypophonia. Language intact. Attention and fund of knowledge appropriate. Recent and remote memory intact. Cranial Nerves:  II: visual fields    II: pupils    II: optic disc    III,VII: ptosis none   III,IV,VI: extraocular muscles  EOMI, no nystagmus, no diplopia   V: facial light touch sensation     VII: facial muscle function   symmetric   VIII: hearing intact   IX: soft palate elevation     XI: trapezius strength     XI: sternocleidomastoid strength    XII: tongue       Motor: normal bulk, +Cogwheel rigidity L>R, no tremor, mild bradykinesia,  Strength: 5/5 throughout, no PD  Sensory:   Coordination: FTN and ОЛЬГА intact  Gait: Shuffling, does not lift right leg as well as left, dec arm swing in right arm, stooped posture, extra step to turn. Reflexes:            Assessment/Plan   Pt is a 66 y.o. right handed male with PD, diagnosed in 2008, reporting an action tremor, drags his right foot, hypophonia, no freezing, no wearing off, no swallowing difficulty, no NOH, no hallucinations. Pt has not appreciated a difference in parkinson's sxs with Carbidopa-levodopa. Continues to have unexplained wt loss.  Exam with hypophonia shuffling does not lift right leg fully, decreased arm swing on the right, stooped posture, extra step to turn. No resting tremor seen. Suggested a trial of lowering Carbidopa-levodopa slowly to see if appetite improves. Instructed to call if he notices worsening of Parkinsons sxs as he lowers dose. Continued Comtan 200mg 5 times a day with Carbidopa-levodopa. Follow-up in the clinic in 4 months, instructed to call in the interim with any questions or concerns. ICD-10-CM ICD-9-CM    1. Parkinson's disease ([facility] [address]  G20 332.0    2. Poor appetite  R63.0 783.0        Signed:   Radha Mckeon MD  11/4/2021

## 2021-11-04 NOTE — LETTER
11/17/2021    Patient: Lashae Bhatti   YOB: 1950   Date of Visit: 11/4/2021     Mary Montes MD  [facility] [phone]  Via Fax: [phone]    Dear Mary Montes MD,      Thank you for referring Mr. Gray Barnard to [address] [place] [address] for evaluation. My notes for this consultation are attached. If you have questions, please do not hesitate to call me. I look forward to following your patient along with you.       Sincerely,    Jamee Mccormack MD

## 2022-02-17 RX ORDER — ENTACAPONE 200 MG/1
200 TABLET ORAL
Qty: 450 TABLET | Refills: 1 | Status: SHIPPED | OUTPATIENT
Start: 2022-02-17 | End: 2022-04-15 | Stop reason: SDUPTHER

## 2022-02-17 RX ORDER — CARBIDOPA AND LEVODOPA 25; 250 MG/1; MG/1
1 TABLET ORAL
Qty: 450 TABLET | Refills: 1 | Status: SHIPPED | OUTPATIENT
Start: 2022-02-17 | End: 2022-04-15 | Stop reason: SDUPTHER

## 2022-04-15 ENCOUNTER — OFFICE VISIT (OUTPATIENT)
Dept: NEUROLOGY | Age: 72
End: 2022-04-15
Payer: MEDICARE

## 2022-04-15 VITALS
HEART RATE: 72 BPM | RESPIRATION RATE: 16 BRPM | DIASTOLIC BLOOD PRESSURE: 100 MMHG | SYSTOLIC BLOOD PRESSURE: 158 MMHG | TEMPERATURE: 97.2 F | BODY MASS INDEX: 19.99 KG/M2 | WEIGHT: 147.4 LBS | OXYGEN SATURATION: 98 %

## 2022-04-15 DIAGNOSIS — G20 PARKINSON'S DISEASE (HCC): Primary | ICD-10-CM

## 2022-04-15 PROCEDURE — 1101F PT FALLS ASSESS-DOCD LE1/YR: CPT | Performed by: PSYCHIATRY & NEUROLOGY

## 2022-04-15 PROCEDURE — G8420 CALC BMI NORM PARAMETERS: HCPCS | Performed by: PSYCHIATRY & NEUROLOGY

## 2022-04-15 PROCEDURE — 3017F COLORECTAL CA SCREEN DOC REV: CPT | Performed by: PSYCHIATRY & NEUROLOGY

## 2022-04-15 PROCEDURE — 99213 OFFICE O/P EST LOW 20 MIN: CPT | Performed by: PSYCHIATRY & NEUROLOGY

## 2022-04-15 PROCEDURE — G8536 NO DOC ELDER MAL SCRN: HCPCS | Performed by: PSYCHIATRY & NEUROLOGY

## 2022-04-15 PROCEDURE — G8427 DOCREV CUR MEDS BY ELIG CLIN: HCPCS | Performed by: PSYCHIATRY & NEUROLOGY

## 2022-04-15 PROCEDURE — G8510 SCR DEP NEG, NO PLAN REQD: HCPCS | Performed by: PSYCHIATRY & NEUROLOGY

## 2022-04-15 RX ORDER — CARBIDOPA AND LEVODOPA 25; 250 MG/1; MG/1
1 TABLET ORAL 2 TIMES DAILY
Qty: 180 TABLET | Refills: 3 | Status: SHIPPED | OUTPATIENT
Start: 2022-04-15

## 2022-04-15 RX ORDER — ENTACAPONE 200 MG/1
200 TABLET ORAL 2 TIMES DAILY
Qty: 180 TABLET | Refills: 3 | Status: SHIPPED | OUTPATIENT
Start: 2022-04-15

## 2022-04-15 NOTE — PROGRESS NOTES
Chief Complaint   Patient presents with    Follow-up     Follow up Parkinsons;  Notes cutting down on the Carbidopa Levodopa to BID; notes some neuropathy in the feet and some memory issues     Visit Vitals  BP (!) 150/90 (BP 1 Location: Right arm, BP Patient Position: Sitting, BP Cuff Size: Adult)   Pulse 72   Temp 97.2 °F (36.2 °C) (Temporal)   Resp 16   Wt 66.9 kg (147 lb 6.4 oz)   SpO2 98%   BMI 19.99 kg/m²

## 2022-04-15 NOTE — LETTER
4/30/2022    Patient: Lavonna Dancer   YOB: 1950   Date of Visit: 4/15/2022     Hattie Hernandez MD  [facility] [phone]  Via Fax: [phone]    Dear Hattie Hernandez MD,      Thank you for referring Mr. Falguni Gomez to [address] [place] [address] for evaluation. My notes for this consultation are attached. If you have questions, please do not hesitate to call me. I look forward to following your patient along with you.       Sincerely,    Garrett Altman MD

## 2022-04-15 NOTE — PROGRESS NOTES
Neurology Consult Note      HISTORY PROVIDED BY: patient    Chief Complaint:   Chief Complaint   Patient presents with    Follow-up     Follow up Parkinsons; Notes cutting down on the Carbidopa Levodopa to BID; notes some neuropathy in the feet and some memory issues      Subjective:   Pt is a 74 y.o. right handed male last seen in clinic on 11/4/21 in f/u for PD, diagnosed in 2008, reporting an action tremor, drags his right foot, hypophonia, no freezing, no wearing off, no swallowing difficulty, no NOH, no hallucinations. Pt has not appreciated a difference in parkinson's sxs with Carbidopa-levodopa. Continues to have unexplained wt loss. Exam with hypophonia shuffling does not lift right leg fully, decreased arm swing on the right, stooped posture, extra step to turn. No resting tremor seen. Suggested a trial of lowering Carbidopa-levodopa slowly to see if appetite improves. Instructed to call if he notices worsening of Parkinsons sxs as he lowers dose. Continued Comtan 200mg with Carbidopa-levodopa. He returns for f/u. He has decreased the C/L to bid and feels he is doing well. Appetite has improved, he has gained 28lbs since last visit. Has not noticed worsening of tremors. C/o \"neuropathy\" sxs, pain at night in feet, takes celexa. No numbness. Has swelling, redness, and tenderness.      Symptoms:   Tremors/Shaking Yes  Problems with balance, shuffling walk, falls: no falls, +shuffling  Slowness (bradykinesia): yes  Freezing or wearing off, off time:none  Soft voice, issues swallowing: +soft voice, no swallowing issues  Sudden, erratic movements (Dyskinesias): none   Memory loss, difficulty thinking or remembering clearly: yes - likely normal for age  Depression/anxiety: No  Difficulty sleeping, issues with talking in sleep or acting out dreams: no  Constipation: No severe constipation   Hallucinations/delusions: No  Nausea: None   Lightheadedness, positional dizziness: No  Compulsive behaviors/urges None   Morning akinesia : No     Past Medical History:   Diagnosis Date    Aortic atherosclerosis (HCC)     Arthritis     Chronic obstructive pulmonary disease (HCC)     emphysema    Depression     History of left shoulder fracture     Hyperlipidemia     Hypertension     Ill-defined condition     hx dislocated left shoulder    Ill-defined condition     prostate ca/ seed implant    Lumbar spinal stenosis     PT/injection/accupunture/exercise [facility] 2 tmes a week    Parkinson's disease ([address]     Shingles     Stroke ([facility] [address] [facility] 2002    no residual problems      Past Surgical History:   Procedure Laterality Date    COLONOSCOPY N/A 2/19/2020    COLONOS[facility]annan [facility]iegler [facility] at [facility] ENDOSCOPY    HX CATARACT REMOVAL Bilateral     HX ORTHOPAEDIC      steroid injections in back d/t spinal stenosis    HX TONSILLECTOMY        Social History     Socioeconomic History    Marital status:      Spouse name: Not on file    Number of children: Not on file    Years of education: Not on file    Highest education level: Not on file   Occupational History    Not on file   Tobacco Use    Smoking status: Former Smoker    Smokeless tobacco: Never Used    Tobacco comment: quit 31 yrs ago   Substance and Sexual Activity    Alcohol use: Yes     Alcohol/week: 7.0 standard drinks     Types: 7 Glasses of wine per week     Comment: wine with dinner/mixed drink    Drug use: No    Sexual activity: Not Currently   Other Topics Concern    Not on file   Social History Narrative    Lives in [place] alone, does not drive     Social Determinants of Health     Financial Resource Strain:     Difficulty of Paying Living Expenses: Not on file   Food Insecurity:     Worried About Running Out of Food in the Last Year: Not on file    Indira of Food in the Last Year: Not on file   Transportation Needs:     Lack of Transportation (Medical): Not on file    Lack of Transportation (Non-Medical):  Not on file   Physical Activity:     Days of Exercise per Week: Not on file    Minutes of Exercise per Session: Not on file   Stress:     Feeling of Stress : Not on file   Social Connections:     Frequency of Communication with Friends and Family: Not on file    Frequency of Social Gatherings with Friends and Family: Not on file    Attends Voodoo Services: Not on file    Active Member of [address] [place] [address] [employer] Travel or Organizations: Not on file    Attends Club or Organization Meetings: Not on file    Marital Status: Not on file   Intimate Partner Violence:     Fear of Current or Ex-Partner: Not on file    Emotionally Abused: Not on file    Physically Abused: Not on file    Sexually Abused: Not on file   Housing Stability:     Unable to Pay for Housing in the Last Year: Not on file    Number of [place] in the Last Year: Not on file    Unstable Housing in the Last Year: Not on file     Family History   Problem Relation Age of Onset    Other Father         kidney removed - unsure of reason, was estranged    Cancer Maternal Aunt         ? where    Heart Attack Paternal Uncle     Cancer Maternal Grandmother         ? where    Kidney Disease Maternal [address]         ? where - 35s    Heart Attack Paternal Uncle     Kidney Disease Cousin         kidney failure    Alcohol abuse Cousin     Dementia Mother         she is currently 79yo    No Known Problems Daughter          Objective:   ROS:  Per HPI or PMH o/w neg    Allergies   Allergen Reactions    Lisinopril Cough    Other Medication Rash     Vaccine for polio caused a rash. Meds:    Current Outpatient Medications:     diazePAM (VALIUM) 5 mg tablet, Take 5 mg by mouth three (3) times daily. , Disp: , Rfl:     fluticasone propion-salmeteroL (ADVAIR/WIXELA) 500-50 mcg/dose diskus inhaler, Take 1 Puff by inhalation daily. , Disp: , Rfl:     mirtazapine (REMERON) 30 mg tablet, Take 30 mg by mouth nightly., Disp: , Rfl:     pantoprazole (PROTONIX) 40 mg tablet, Take 40 mg by mouth daily. , Disp: , Rfl:     cyclobenzaprine (FLEXERIL) 10 mg tablet, Take 1 Tab by mouth nightly., Disp: 7 Tab, Rfl: 0    naproxen (NAPROSYN) 500 mg tablet, Take 1 Tab by mouth every twelve (12) hours as needed for Pain., Disp: 20 Tab, Rfl: 0    citalopram (CELEXA) 10 mg tablet, Take 10 mg by mouth daily. , Disp: , Rfl:     ondansetron hcl (ZOFRAN) 8 mg tablet, Take 8 mg by mouth three (3) times daily as needed for Nausea., Disp: , Rfl:     tamsulosin HCl (TAMSULOSIN PO), Take 0.4 mg by mouth nightly. tamsulosin ER, Disp: , Rfl:     albuterol (VENTOLIN HFA) 90 mcg/actuation inhaler, Take 2 Puffs by inhalation two (2) times daily as needed for Wheezing., Disp: , Rfl:     budesonide-formoterol (SYMBICORT) 80-4.5 mcg/actuation HFAA inhaler, Take 2 Puffs by inhalation two (2) times a day., Disp: , Rfl:     carbidopa-levodopa (SINEMET)  mg per tablet, TAKE 1 TABLET BY MOUTH FIVE (5) TIMES DAILY. (Patient taking differently: Take 1 Tablet by mouth five (5) times daily. BID), Disp: 450 Tablet, Rfl: 1    entacapone (COMTAN) 200 mg tablet, TAKE 1 TABLET BY MOUTH FIVE (5) TIMES DAILY. WITH CARBIDOPA-LEVODOPA (Patient taking differently: Take 200 mg by mouth five (5) times daily. With carbidopa-levodopa BID), Disp: 450 Tablet, Rfl: 1    cholecalciferol (Vitamin D3) (1000 Units /25 mcg) tablet, Take  by mouth daily. , Disp: , Rfl:     riboflavin, vitamin B2, (VITAMIN B-2) 100 mg tablet, Take 100 mg by mouth daily. (Patient not taking: Reported on 4/15/2022), Disp: , Rfl:     atorvastatin (LIPITOR) 20 mg tablet, Take 20 mg by mouth daily. , Disp: , Rfl:     Imaging:  MRI Results (most recent):  Results from [place] encounter on 06/17/15    MRI LUMB SPINE WO CONT    Narrative  **Final Report**      ICD Codes / Adm. Diagnosis: 724.4  722.52 / Thoracic or lumbosacral neurit  Degeneration of lumbar or shine  Examination:  MR WILSON SPINE WO CON  - [id number] - Jun 17 2015  1:49PM  Accession No: [id number]  Reason:      REPORT:  Clinical indication: Back pain. Technical factors: Sagittal T1-weighted T2-weighted and STIR, axial T1 and  T2-weighted L1-S1. There is some mild bone marrow edema is seen in the chair endplate of L4  possibly degenerative in nature. There is no evidence for marrow  replacement. The alignment is satisfactory. The conus appears unremarkable. No paraspinal masses or fluid collections. L1-L2 shows no focal findings. L2-L3 shows minimal facet disease and ligamentum hypertrophy but no canal or  foraminal compromise. L3-L4 shows some minimal facet disease and ligamentum thickening no focal  findings. L4-5 show a left lateral disc protrusion with some encroachment on the  foramen and possible involvement of the exiting nerve roots. There is no  canal compromise. L5-S1 show no focal findings. Impression  : Left lateral disc protrusion L4-5. Signing/Reading Doctor: Maureen Pablo ([id number])  Approved: Maureen Pablo ([id number])  Jun 17 2015  2:37PM     CT Results (most recent):  Results from Hospital Encounter encounter on 09/08/21    CT CHEST W CONT    Narrative  INDICATION: Emphysematous bleb, weight loss    COMPARISON: 2018    TECHNIQUE:  Following the uneventful intravenous administration of 95 cc  Isovue-300, 5 mm axial images were obtained through the thorax. Coronal and  sagittal reformats were generated. CT dose reduction was achieved through use  of a standardized protocol tailored for this examination and automatic exposure  control for dose modulation. FINDINGS:    CHEST WALL: No mass or axillary lymphadenopathy. THYROID: No nodule. MEDIASTINUM: No mass or lymphadenopathy. FELY: No mass or lymphadenopathy. THORACIC AORTA: No dissection or aneurysm. MAIN PULMONARY ARTERY: Normal in caliber. TRACHEA/BRONCHI: Patent. ESOPHAGUS: No wall thickening or dilatation. HEART: Normal in size. PLEURA: No effusion or pneumothorax. LUNGS: Panlobular emphysema. Mild scarring in the right upper lobe. BONES: Sclerotic lesion in the thoracic spine is stable. Impression  Panlobular emphysema suggesting alpha-1 antitrypsin deficiency. No acute findings. Reviewed records in [facility] [employer] tab today    Lab Review   Results for orders placed or performed during the hospital encounter of 09/08/21   CREATININE, POC   Result Value Ref Range    Creatinine (POC) 0.90 0.6 - 1.3 mg/dL    GFRAA, POC >60 >60 ml/min/1.73m2    GFRNA, POC >60 >60 ml/min/1.73m2        Exam:  Visit Vitals  BP (!) 150/90 (BP 1 Location: Right arm, BP Patient Position: Sitting, BP Cuff Size: Adult)   Pulse 72   Temp 97.2 °F (36.2 °C) (Temporal)   Resp 16   Wt 147 lb 6.4 oz (66.9 kg)   SpO2 98%   BMI 19.99 kg/m²     General:  Alert, cooperative, no distress. Head:  Normocephalic, without obvious abnormality, atraumatic. Respiratory:  Heart:   Non labored breathing  Regular rate and rhythm, no murmurs   Neck:      Extremities: Warm, no cyanosis, LE edema with redness and tenderness to palpation. Pulses: 2+ radial pulses. Neurologic:  MS: Alert and oriented x 4, speech - hypophonia. Language intact. Attention and fund of knowledge appropriate. Recent and remote memory intact. Cranial Nerves:  II: visual fields    II: pupils    II: optic disc    III,VII: ptosis none   III,IV,VI: extraocular muscles  EOMI, no nystagmus, no diplopia   V: facial light touch sensation     VII: facial muscle function   symmetric   VIII: hearing intact   IX: soft palate elevation     XI: trapezius strength     XI: sternocleidomastoid strength    XII: tongue       Motor: normal bulk, +Cogwheel rigidity L>R, no tremor, mild bradykinesia,  Strength: 5/5 throughout, no PD  Sensory:   Coordination: FTN and ОЛЬГА intact  Gait: Shuffling, does not lift right leg as well as left, dec arm swing in right arm, stooped posture, extra step to turn.    Reflexes:        Assessment/Plan   Pt is a 74 y.o. right handed male with PD, diagnosed in 2008, reporting an action tremor, drags his right foot, hypophonia, no freezing, no wearing off, no swallowing difficulty, no NOH, no hallucinations. Pt feels he is doing well on low dose Carbidopa-levodopa with improvement in appetite. Exam with hypophonia shuffling does not lift right leg fully, decreased arm swing on the right, stooped posture, extra step to turn. Continue Carbidopa-levodopa 25-100mg bid and Comtan 200mg bid. Pt will f/u with PCP for LE swelling, redness, and tenderness. Follow-up in the clinic in 6 months, instructed to call in the interim with any questions or concerns. ICD-10-CM ICD-9-CM    1. [facility]'s [facility] ([facility]  G20 332.0        Signed:   Ana Arnold MD  4/15/2022

## 2023-05-19 RX ORDER — PANTOPRAZOLE SODIUM 40 MG/1
40 TABLET, DELAYED RELEASE ORAL DAILY
COMMUNITY
Start: 2021-02-23

## 2023-05-19 RX ORDER — ALBUTEROL SULFATE 90 UG/1
2 AEROSOL, METERED RESPIRATORY (INHALATION) 2 TIMES DAILY PRN
COMMUNITY

## 2023-05-19 RX ORDER — NAPROXEN 500 MG/1
500 TABLET ORAL
COMMUNITY
Start: 2021-02-27

## 2023-05-19 RX ORDER — MIRTAZAPINE 30 MG/1
30 TABLET, FILM COATED ORAL NIGHTLY
COMMUNITY
Start: 2021-02-23

## 2023-05-19 RX ORDER — BUDESONIDE AND FORMOTEROL FUMARATE DIHYDRATE 80; 4.5 UG/1; UG/1
2 AEROSOL RESPIRATORY (INHALATION) 2 TIMES DAILY
COMMUNITY

## 2023-05-19 RX ORDER — CYCLOBENZAPRINE HCL 10 MG
10 TABLET ORAL
COMMUNITY
Start: 2021-02-27

## 2023-05-19 RX ORDER — CARBIDOPA/LEVODOPA 25MG-250MG
1 TABLET ORAL 2 TIMES DAILY
COMMUNITY
Start: 2022-04-15

## 2023-05-19 RX ORDER — CITALOPRAM 10 MG/1
10 TABLET ORAL DAILY
COMMUNITY

## 2023-05-19 RX ORDER — DIAZEPAM 5 MG/1
5 TABLET ORAL 3 TIMES DAILY
COMMUNITY
Start: 2021-03-11

## 2023-05-19 RX ORDER — ONDANSETRON HYDROCHLORIDE 8 MG/1
8 TABLET, FILM COATED ORAL 3 TIMES DAILY PRN
COMMUNITY

## 2023-05-19 RX ORDER — ENTACAPONE 200 MG/1
200 TABLET ORAL 2 TIMES DAILY
COMMUNITY
Start: 2022-04-15

## (undated) DEVICE — Z DISCONTINUED NO SUB IDED SET EXTN W/ 4 W STPCOCK M SPIN LOK 36IN

## (undated) DEVICE — FORCEPS BX L240CM JAW DIA2.8MM L CAP W/ NDL MIC MESH TOOTH

## (undated) DEVICE — SYRINGE MED 20ML STD CLR PLAS LUERLOCK TIP N CTRL DISP

## (undated) DEVICE — SET ADMIN 16ML TBNG L100IN 2 Y INJ SITE IV PIGGY BK DISP

## (undated) DEVICE — CONTAINER SPEC 20 ML LID NEUT BUFF FORMALIN 10 % POLYPR STS

## (undated) DEVICE — ENDO CARRY-ON PROCEDURE KIT INCLUDES ENZYMATIC SPONGE, GAUZE, BIOHAZARD LABEL, TRAY, LUBRICANT, DIRTY SCOPE LABEL, WATER LABEL, TRAY, DRAWSTRING PAD, AND DEFENDO 4-PIECE KIT.: Brand: ENDO CARRY-ON PROCEDURE KIT

## (undated) DEVICE — (D)CUP DENT 7.5OZ PLAS DSTY -- DISC BY MFR USE ITEM 341725

## (undated) DEVICE — BAG BELONG PT PERS CLEAR HANDL

## (undated) DEVICE — BW-412T DISP COMBO CLEANING BRUSH: Brand: SINGLE USE COMBINATION CLEANING BRUSH

## (undated) DEVICE — CANN NASAL O2 CAPNOGRAPHY AD -- FILTERLINE

## (undated) DEVICE — NEEDLE HYPO 18GA L1.5IN PNK S STL HUB POLYPR SHLD REG BVL

## (undated) DEVICE — BAG SPEC BIOHZD LF 2MIL 6X10IN -- CONVERT TO ITEM 357326

## (undated) DEVICE — KENDALL RADIOLUCENT FOAM MONITORING ELECTRODE -RECTANGULAR SHAPE: Brand: KENDALL

## (undated) DEVICE — WRISTBAND ID AD W1XL11.5IN RED POLY ALRG PREPRINTED PERM

## (undated) DEVICE — 1200 GUARD II KIT W/5MM TUBE W/O VAC TUBE: Brand: GUARDIAN

## (undated) DEVICE — SOLIDIFIER FLUID 3000 CC ABSORB

## (undated) DEVICE — Z CONVERTED USE 2274299 CUFF BLD PRESSURE LNG MED AD 25-35 CM ARM FLEXIPORT DISP

## (undated) DEVICE — CATH IV AUTOGRD BC BLU 22GA 25 -- INSYTE

## (undated) DEVICE — Device: Brand: MEDICAL ACTION INDUSTRIES